# Patient Record
Sex: FEMALE | Race: WHITE | NOT HISPANIC OR LATINO | URBAN - METROPOLITAN AREA
[De-identification: names, ages, dates, MRNs, and addresses within clinical notes are randomized per-mention and may not be internally consistent; named-entity substitution may affect disease eponyms.]

---

## 2017-04-28 ENCOUNTER — OUTPATIENT (OUTPATIENT)
Dept: OUTPATIENT SERVICES | Facility: HOSPITAL | Age: 72
LOS: 1 days | Discharge: HOME | End: 2017-04-28

## 2017-06-28 DIAGNOSIS — J43.9 EMPHYSEMA, UNSPECIFIED: ICD-10-CM

## 2017-09-18 ENCOUNTER — OUTPATIENT (OUTPATIENT)
Dept: OUTPATIENT SERVICES | Facility: HOSPITAL | Age: 72
LOS: 1 days | Discharge: HOME | End: 2017-09-18

## 2017-09-18 DIAGNOSIS — Z12.31 ENCOUNTER FOR SCREENING MAMMOGRAM FOR MALIGNANT NEOPLASM OF BREAST: ICD-10-CM

## 2017-12-12 ENCOUNTER — OUTPATIENT (OUTPATIENT)
Dept: OUTPATIENT SERVICES | Facility: HOSPITAL | Age: 72
LOS: 1 days | Discharge: HOME | End: 2017-12-12

## 2017-12-14 DIAGNOSIS — N95.9 UNSPECIFIED MENOPAUSAL AND PERIMENOPAUSAL DISORDER: ICD-10-CM

## 2017-12-14 DIAGNOSIS — F17.200 NICOTINE DEPENDENCE, UNSPECIFIED, UNCOMPLICATED: ICD-10-CM

## 2017-12-14 DIAGNOSIS — Z13.820 ENCOUNTER FOR SCREENING FOR OSTEOPOROSIS: ICD-10-CM

## 2018-09-19 ENCOUNTER — OUTPATIENT (OUTPATIENT)
Dept: OUTPATIENT SERVICES | Facility: HOSPITAL | Age: 73
LOS: 1 days | Discharge: HOME | End: 2018-09-19

## 2018-09-19 DIAGNOSIS — Z12.31 ENCOUNTER FOR SCREENING MAMMOGRAM FOR MALIGNANT NEOPLASM OF BREAST: ICD-10-CM

## 2019-02-22 ENCOUNTER — OUTPATIENT (OUTPATIENT)
Dept: OUTPATIENT SERVICES | Facility: HOSPITAL | Age: 74
LOS: 1 days | Discharge: HOME | End: 2019-02-22

## 2019-02-22 DIAGNOSIS — R91.8 OTHER NONSPECIFIC ABNORMAL FINDING OF LUNG FIELD: ICD-10-CM

## 2019-02-27 ENCOUNTER — RESULT REVIEW (OUTPATIENT)
Age: 74
End: 2019-02-27

## 2019-02-27 ENCOUNTER — EMERGENCY (EMERGENCY)
Facility: HOSPITAL | Age: 74
LOS: 0 days | Discharge: HOME | End: 2019-02-27
Attending: EMERGENCY MEDICINE | Admitting: EMERGENCY MEDICINE

## 2019-02-27 VITALS
SYSTOLIC BLOOD PRESSURE: 129 MMHG | HEART RATE: 97 BPM | DIASTOLIC BLOOD PRESSURE: 86 MMHG | TEMPERATURE: 98 F | OXYGEN SATURATION: 97 % | RESPIRATION RATE: 20 BRPM

## 2019-02-27 VITALS — WEIGHT: 104.94 LBS | HEIGHT: 60 IN

## 2019-02-27 DIAGNOSIS — Z88.5 ALLERGY STATUS TO NARCOTIC AGENT: ICD-10-CM

## 2019-02-27 DIAGNOSIS — Z79.52 LONG TERM (CURRENT) USE OF SYSTEMIC STEROIDS: ICD-10-CM

## 2019-02-27 DIAGNOSIS — Z79.2 LONG TERM (CURRENT) USE OF ANTIBIOTICS: ICD-10-CM

## 2019-02-27 DIAGNOSIS — R06.02 SHORTNESS OF BREATH: ICD-10-CM

## 2019-02-27 DIAGNOSIS — Z79.899 OTHER LONG TERM (CURRENT) DRUG THERAPY: ICD-10-CM

## 2019-02-27 DIAGNOSIS — F17.200 NICOTINE DEPENDENCE, UNSPECIFIED, UNCOMPLICATED: ICD-10-CM

## 2019-02-27 DIAGNOSIS — J90 PLEURAL EFFUSION, NOT ELSEWHERE CLASSIFIED: ICD-10-CM

## 2019-02-27 LAB
ANION GAP SERPL CALC-SCNC: 15 MMOL/L — HIGH (ref 7–14)
APTT BLD: 29.9 SEC — SIGNIFICANT CHANGE UP (ref 27–39.2)
B PERT IGG+IGM PNL SER: ABNORMAL
BASOPHILS # BLD AUTO: 0.01 K/UL — SIGNIFICANT CHANGE UP (ref 0–0.2)
BASOPHILS NFR BLD AUTO: 0.1 % — SIGNIFICANT CHANGE UP (ref 0–1)
BUN SERPL-MCNC: 19 MG/DL — SIGNIFICANT CHANGE UP (ref 10–20)
CALCIUM SERPL-MCNC: 9.1 MG/DL — SIGNIFICANT CHANGE UP (ref 8.5–10.1)
CHLORIDE SERPL-SCNC: 97 MMOL/L — LOW (ref 98–110)
CO2 SERPL-SCNC: 30 MMOL/L — SIGNIFICANT CHANGE UP (ref 17–32)
COLOR FLD: SIGNIFICANT CHANGE UP
CREAT SERPL-MCNC: 0.6 MG/DL — LOW (ref 0.7–1.5)
EOSINOPHIL # BLD AUTO: 0 K/UL — SIGNIFICANT CHANGE UP (ref 0–0.7)
EOSINOPHIL NFR BLD AUTO: 0 % — SIGNIFICANT CHANGE UP (ref 0–8)
FLUID INTAKE SUBSTANCE CLASS: SIGNIFICANT CHANGE UP
FLUID SEGMENTED GRANULOCYTES: 7 % — SIGNIFICANT CHANGE UP
GLUCOSE SERPL-MCNC: 124 MG/DL — HIGH (ref 70–99)
HCT VFR BLD CALC: 46.3 % — SIGNIFICANT CHANGE UP (ref 37–47)
HGB BLD-MCNC: 15.9 G/DL — SIGNIFICANT CHANGE UP (ref 12–16)
IMM GRANULOCYTES NFR BLD AUTO: 0.4 % — HIGH (ref 0.1–0.3)
INR BLD: 1.05 RATIO — SIGNIFICANT CHANGE UP (ref 0.65–1.3)
LYMPHOCYTES # BLD AUTO: 0.64 K/UL — LOW (ref 1.2–3.4)
LYMPHOCYTES # BLD AUTO: 6.5 % — LOW (ref 20.5–51.1)
LYMPHOCYTES # FLD: 88 — SIGNIFICANT CHANGE UP
MCHC RBC-ENTMCNC: 32.2 PG — HIGH (ref 27–31)
MCHC RBC-ENTMCNC: 34.3 G/DL — SIGNIFICANT CHANGE UP (ref 32–37)
MCV RBC AUTO: 93.7 FL — SIGNIFICANT CHANGE UP (ref 81–99)
MONOCYTES # BLD AUTO: 0.29 K/UL — SIGNIFICANT CHANGE UP (ref 0.1–0.6)
MONOCYTES NFR BLD AUTO: 2.9 % — SIGNIFICANT CHANGE UP (ref 1.7–9.3)
MONOS+MACROS # FLD: 5 % — SIGNIFICANT CHANGE UP
NEUTROPHILS # BLD AUTO: 8.89 K/UL — HIGH (ref 1.4–6.5)
NEUTROPHILS NFR BLD AUTO: 90.1 % — HIGH (ref 42.2–75.2)
NRBC # BLD: 0 /100 WBCS — SIGNIFICANT CHANGE UP (ref 0–0)
NT-PROBNP SERPL-SCNC: 251 PG/ML — SIGNIFICANT CHANGE UP (ref 0–300)
PLATELET # BLD AUTO: 310 K/UL — SIGNIFICANT CHANGE UP (ref 130–400)
POTASSIUM SERPL-MCNC: 4.3 MMOL/L — SIGNIFICANT CHANGE UP (ref 3.5–5)
POTASSIUM SERPL-SCNC: 4.3 MMOL/L — SIGNIFICANT CHANGE UP (ref 3.5–5)
PROTHROM AB SERPL-ACNC: 12.1 SEC — SIGNIFICANT CHANGE UP (ref 9.95–12.87)
RBC # BLD: 4.94 M/UL — SIGNIFICANT CHANGE UP (ref 4.2–5.4)
RBC # FLD: 12.2 % — SIGNIFICANT CHANGE UP (ref 11.5–14.5)
RCV VOL RI: HIGH /UL (ref 0–5)
SODIUM SERPL-SCNC: 142 MMOL/L — SIGNIFICANT CHANGE UP (ref 135–146)
TOTAL NUCLEATED CELL COUNT, BODY FLUID: 4043 /UL — HIGH (ref 0–5)
TROPONIN T SERPL-MCNC: <0.01 NG/ML — SIGNIFICANT CHANGE UP
TUBE TYPE: SIGNIFICANT CHANGE UP
WBC # BLD: 9.87 K/UL — SIGNIFICANT CHANGE UP (ref 4.8–10.8)
WBC # FLD AUTO: 9.87 K/UL — SIGNIFICANT CHANGE UP (ref 4.8–10.8)

## 2019-02-27 RX ORDER — SODIUM CHLORIDE 9 MG/ML
3 INJECTION INTRAMUSCULAR; INTRAVENOUS; SUBCUTANEOUS EVERY 8 HOURS
Qty: 0 | Refills: 0 | Status: DISCONTINUED | OUTPATIENT
Start: 2019-02-27 | End: 2019-02-27

## 2019-02-27 RX ADMIN — SODIUM CHLORIDE 3 MILLILITER(S): 9 INJECTION INTRAMUSCULAR; INTRAVENOUS; SUBCUTANEOUS at 14:49

## 2019-02-27 NOTE — ED ADULT NURSE NOTE - NSFALLRSKASSESSDT_ED_ALL_ED
Telephone Encounter by Faviola Jensen NCMA at 05/14/18 03:52 PM     Author:  Faviola Jensen NCMA Service:  (none) Author Type:  Certified Medical Assistant     Filed:  05/14/18 03:54 PM Encounter Date:  5/14/2018 Status:  Signed     :  Faviola Jensen NCMA (Certified Medical Assistant)            Will postpone message to enter in-basket mid-June (per Olive, son) - will also add to LAR wait-list.  Patient to be rescheduled for office UD with Dr. Ta.[CF1.1M]      Revision History        User Key Date/Time User Provider Type Action    > CF1.1 05/14/18 03:54 PM Faviola Jensen NCMA Certified Medical Assistant Sign    M - Manual             27-Feb-2019 12:51

## 2019-02-27 NOTE — ED PROVIDER NOTE - CLINICAL SUMMARY MEDICAL DECISION MAKING FREE TEXT BOX
75 yo female with recently diagnosed lung mass with pleural effusion presented with SOB, referred to ED by Dr. Abrams for thoracentesis. Pt had procedure and repeat CXR without PTX. Pt reported feeling better and was offered admission but refused. Pt stated she preferred to continue her workup outpatient. Return precautions advised and pt and s/o verbalized understanding.

## 2019-02-27 NOTE — ED ADULT NURSE NOTE - OBJECTIVE STATEMENT
Patient presents to ED stating that she was sent by Dr. Melchor for drainage of excess fluid around the left lung. Patient states that she was recently treated for pneumonia and fluid on the lungs. The pneumonia was treated with antibiotics but following treatment the severe shortness of breathe on light exertion persisted and became accompanied by foot and ankle swelling.

## 2019-02-27 NOTE — ED PROVIDER NOTE - ATTENDING CONTRIBUTION TO CARE
75 yo female with PMH COPD, current smoker, recently treated for possible PNA but found to have left hilar mass and large effusion suspicious for malignancy. Pt with increased SOB and referred to ED for thoracentesis by her pulmonologist Dr. Abrams. Pt denies any fevers or chills.  + chronically poor appetite per pt. NO N/V/d or abdominal pain. Denies any chest pain, + cough.    VS noted, agree with exam as above.  A/P: SOB; Pleural effusion -labs, CXR, pulmonary consult

## 2019-02-27 NOTE — ED PROVIDER NOTE - OBJECTIVE STATEMENT
73 y/o female with hx COPD, smoker presents to the ED c/o "I've been SOB and left chest/ side pain and leg swelling for 1 month. I was diagnosed with pneumonia 2/5 and took a course of Cefuroxime and Prednisone taper. I saw my cardiologist and had an EKG and echo. I had a duplex of my legs which was negative and a chest ct. My pulmonary Dr Jose Alberto wanted to schedule me for thoracentesis but my SOB is getting worse." +cough no fever/ chills/ body aches/ leg pain

## 2019-02-27 NOTE — ED PROVIDER NOTE - MUSCULOSKELETAL, MLM
Spine appears normal, range of motion is not limited, no muscle or joint tenderness +foot swelling B/L

## 2019-02-27 NOTE — PROCEDURE NOTE - NSPROCDETAILS_GEN_ALL_CORE
catheter inserted over needle/ultrasound assessment of effusion (localization)/connection to syringe/connection to evacuated glass bottle/location identified, draped/prepped, sterile technique used, needle inserted/introduced

## 2019-02-27 NOTE — ED PROVIDER NOTE - NSFOLLOWUPINSTRUCTIONS_ED_ALL_ED_FT
FOLLOW UP WITH YOUR DOCTOR THIS WEEK FOR REEVALUATION.      RETURN TO ED IMMEDIATELY WITH ANY WORSENING SYMPTOMS, CHEST PAIN, SHORTNESS OF BREATH, FEVERS, WEAKNESS, DIZZINESS, PERSISTENT OR SEVERE HEADACHE OR ANY OTHER CONCERNS.     Shortness of breath    Shortness of breath (dyspnea) means you have trouble breathing and could indicate a medical problem. Causes include lung disease, heart disease, low amount of red blood cells (anemia), poor physical fitness, being overweight, smoking, etc. Your health care provider today may not be able to find a cause for your shortness of breath after your exam. In this case, it is important to have a follow-up exam with your primary care physician as instructed. If medicines were prescribed, take them as directed for the full length of time directed. Refrain from tobacco products.    SEEK IMMEDIATE MEDICAL CARE IF YOU HAVE ANY OF THE FOLLOWING SYMPTOMS: worsening shortness of breath, chest pain, back pain, abdominal pain, fever, coughing up blood, lightheadedness/dizziness.

## 2019-02-27 NOTE — ED PROVIDER NOTE - PROGRESS NOTE DETAILS
Spoke to Dr Abrams states recent CT showed mass and pleural effusion. Will send fellow to evaluate and plan to do thoracentesis. No need for CT chest with IV contrast. Pulmonary fellow at bedside doing thoracentesis. Patient does not want to be admitted- will continue outpatient work-up. Pt s/o to Dr. Flyod to follow up CXR, reassess and dispo. Pt s/p thoracentesis. Per pulm fellow who discussed with attending pt can be d/c if repeat CXR negative (no PTX). Pt adamantly refusing admission, prefers to continue workup outpatient and will follow up closely with her providers. Pt s/p thoracentesis. Per pulm fellow who discussed with attending pt can be d/c if repeat CXR negative (no PTX). Pt adamantly refusing admission, prefers to continue workup outpatient and will follow up closely with her providers. CXR without any PTX noted, pt reports sx improved.

## 2019-02-28 LAB
ALBUMIN FLD-MCNC: 2.1 G/DL — SIGNIFICANT CHANGE UP
GLUCOSE FLD-MCNC: 118 MG/DL — SIGNIFICANT CHANGE UP
GRAM STN FLD: SIGNIFICANT CHANGE UP
LDH SERPL L TO P-CCNC: 344 U/L — SIGNIFICANT CHANGE UP
PROT FLD-MCNC: 3.5 G/DL — SIGNIFICANT CHANGE UP
SPECIMEN SOURCE: SIGNIFICANT CHANGE UP

## 2019-03-05 LAB
CULTURE RESULTS: SIGNIFICANT CHANGE UP
NON-GYNECOLOGICAL CYTOLOGY STUDY: SIGNIFICANT CHANGE UP
SPECIMEN SOURCE: SIGNIFICANT CHANGE UP

## 2019-03-06 ENCOUNTER — INPATIENT (INPATIENT)
Facility: HOSPITAL | Age: 74
LOS: 5 days | Discharge: HOME HEALTH | End: 2019-03-12
Attending: HOSPITALIST | Admitting: HOSPITALIST
Payer: MEDICARE

## 2019-03-06 VITALS
TEMPERATURE: 98 F | OXYGEN SATURATION: 93 % | SYSTOLIC BLOOD PRESSURE: 99 MMHG | HEART RATE: 100 BPM | RESPIRATION RATE: 22 BRPM | DIASTOLIC BLOOD PRESSURE: 74 MMHG

## 2019-03-06 PROBLEM — F41.9 ANXIETY DISORDER, UNSPECIFIED: Chronic | Status: ACTIVE | Noted: 2019-02-27

## 2019-03-06 PROBLEM — J44.9 CHRONIC OBSTRUCTIVE PULMONARY DISEASE, UNSPECIFIED: Chronic | Status: ACTIVE | Noted: 2019-02-27

## 2019-03-06 LAB
ALBUMIN SERPL ELPH-MCNC: 3.4 G/DL — LOW (ref 3.5–5.2)
ALP SERPL-CCNC: 64 U/L — SIGNIFICANT CHANGE UP (ref 30–115)
ALT FLD-CCNC: 30 U/L — SIGNIFICANT CHANGE UP (ref 0–41)
ANION GAP SERPL CALC-SCNC: 13 MMOL/L — SIGNIFICANT CHANGE UP (ref 7–14)
APTT BLD: 28.5 SEC — SIGNIFICANT CHANGE UP (ref 27–39.2)
AST SERPL-CCNC: 32 U/L — SIGNIFICANT CHANGE UP (ref 0–41)
BASOPHILS # BLD AUTO: 0.03 K/UL — SIGNIFICANT CHANGE UP (ref 0–0.2)
BASOPHILS NFR BLD AUTO: 0.3 % — SIGNIFICANT CHANGE UP (ref 0–1)
BILIRUB SERPL-MCNC: 0.3 MG/DL — SIGNIFICANT CHANGE UP (ref 0.2–1.2)
BUN SERPL-MCNC: 20 MG/DL — SIGNIFICANT CHANGE UP (ref 10–20)
CALCIUM SERPL-MCNC: 8.8 MG/DL — SIGNIFICANT CHANGE UP (ref 8.5–10.1)
CHLORIDE SERPL-SCNC: 97 MMOL/L — LOW (ref 98–110)
CO2 SERPL-SCNC: 28 MMOL/L — SIGNIFICANT CHANGE UP (ref 17–32)
CREAT SERPL-MCNC: 0.6 MG/DL — LOW (ref 0.7–1.5)
EOSINOPHIL # BLD AUTO: 0.01 K/UL — SIGNIFICANT CHANGE UP (ref 0–0.7)
EOSINOPHIL NFR BLD AUTO: 0.1 % — SIGNIFICANT CHANGE UP (ref 0–8)
GLUCOSE SERPL-MCNC: 110 MG/DL — HIGH (ref 70–99)
HCT VFR BLD CALC: 43 % — SIGNIFICANT CHANGE UP (ref 37–47)
HGB BLD-MCNC: 14.8 G/DL — SIGNIFICANT CHANGE UP (ref 12–16)
IMM GRANULOCYTES NFR BLD AUTO: 0.4 % — HIGH (ref 0.1–0.3)
INR BLD: 1.11 RATIO — SIGNIFICANT CHANGE UP (ref 0.65–1.3)
LYMPHOCYTES # BLD AUTO: 0.58 K/UL — LOW (ref 1.2–3.4)
LYMPHOCYTES # BLD AUTO: 5.7 % — LOW (ref 20.5–51.1)
MCHC RBC-ENTMCNC: 32.4 PG — HIGH (ref 27–31)
MCHC RBC-ENTMCNC: 34.4 G/DL — SIGNIFICANT CHANGE UP (ref 32–37)
MCV RBC AUTO: 94.1 FL — SIGNIFICANT CHANGE UP (ref 81–99)
MONOCYTES # BLD AUTO: 0.44 K/UL — SIGNIFICANT CHANGE UP (ref 0.1–0.6)
MONOCYTES NFR BLD AUTO: 4.3 % — SIGNIFICANT CHANGE UP (ref 1.7–9.3)
NEUTROPHILS # BLD AUTO: 9.14 K/UL — HIGH (ref 1.4–6.5)
NEUTROPHILS NFR BLD AUTO: 89.2 % — HIGH (ref 42.2–75.2)
NRBC # BLD: 0 /100 WBCS — SIGNIFICANT CHANGE UP (ref 0–0)
PLATELET # BLD AUTO: 304 K/UL — SIGNIFICANT CHANGE UP (ref 130–400)
POTASSIUM SERPL-MCNC: 3.8 MMOL/L — SIGNIFICANT CHANGE UP (ref 3.5–5)
POTASSIUM SERPL-SCNC: 3.8 MMOL/L — SIGNIFICANT CHANGE UP (ref 3.5–5)
PROT SERPL-MCNC: 5.9 G/DL — LOW (ref 6–8)
PROTHROM AB SERPL-ACNC: 12.8 SEC — SIGNIFICANT CHANGE UP (ref 9.95–12.87)
RBC # BLD: 4.57 M/UL — SIGNIFICANT CHANGE UP (ref 4.2–5.4)
RBC # FLD: 12.4 % — SIGNIFICANT CHANGE UP (ref 11.5–14.5)
SODIUM SERPL-SCNC: 138 MMOL/L — SIGNIFICANT CHANGE UP (ref 135–146)
WBC # BLD: 10.24 K/UL — SIGNIFICANT CHANGE UP (ref 4.8–10.8)
WBC # FLD AUTO: 10.24 K/UL — SIGNIFICANT CHANGE UP (ref 4.8–10.8)

## 2019-03-06 RX ORDER — FUROSEMIDE 40 MG
0 TABLET ORAL
Qty: 0 | Refills: 0 | COMMUNITY

## 2019-03-06 RX ORDER — ALPRAZOLAM 0.25 MG
0.25 TABLET ORAL ONCE
Qty: 0 | Refills: 0 | Status: DISCONTINUED | OUTPATIENT
Start: 2019-03-06 | End: 2019-03-06

## 2019-03-06 RX ORDER — ALBUTEROL 90 UG/1
0 AEROSOL, METERED ORAL
Qty: 0 | Refills: 0 | COMMUNITY

## 2019-03-06 RX ORDER — SERTRALINE 25 MG/1
0 TABLET, FILM COATED ORAL
Qty: 0 | Refills: 0 | COMMUNITY

## 2019-03-06 RX ORDER — UMECLIDINIUM BROMIDE AND VILANTEROL TRIFENATATE 62.5; 25 UG/1; UG/1
1 POWDER RESPIRATORY (INHALATION)
Qty: 0 | Refills: 0 | COMMUNITY

## 2019-03-06 RX ADMIN — Medication 0.25 MILLIGRAM(S): at 16:14

## 2019-03-06 NOTE — ED PROVIDER NOTE - PHYSICAL EXAMINATION
Vital Signs: I have reviewed the initial vital signs.  Constitutional: thin female, appears stated age, no acute distress  HEENT: PERRL, pink conjunctivae., nml phonation  NEck: supple, no palpable masses or HOLLIE  Cardiovascular: regular rate, regular rhythm, well-perfused extremities, capillary refill < 2 sec, distal pulses intact  Respiratory: unlabored respiratory effort, no wheezing or rhonchi, decreased air entry at the left base, s  Integumentary: warm, dry, no rash  Neurologic: awake, al Vital Signs: I have reviewed the initial vital signs.  Constitutional: thin female, appears stated age, no acute distress  HEENT: PERRL, pink conjunctivae, dry mouth, nml phonation,   Neck: supple, no palpable masses  Cardiovascular: regular rate, regular rhythm, well-perfused extremities, capillary refill < 2 sec, distal pulses intact  Respiratory: unlabored respiratory effort, speaking full sentences, no wheezing or rhonchi, decreased breath sounds at the left  base, mild dyspnea on ambulation  Gastrointestinal: soft, non-distended abdomen, minimal LUQ ttp without rebound or guarding, no CVA ttp, no pulsatile mass  Musculoskeletal: supple neck, no midline spine ttp, no calf ttp, b/l mild lower extremity edema below the knees, FROM at all joints  Integumentary: warm, dry, no rash  Neurologic: awake, alert, cranial nerves II-XII grossly intact, extremities’ motor and sensory functions grossly intact  Psychiatric: appropriate mood, appropriate affect

## 2019-03-06 NOTE — ED ADULT NURSE NOTE - NSIMPLEMENTINTERV_GEN_ALL_ED
Implemented All Fall Risk Interventions:  [place] to call system. Call bell, personal items and telephone within reach. Instruct patient to call for assistance. Room bathroom lighting operational. Non-slip footwear when patient is off stretcher. Physically safe environment: no spills, clutter or unnecessary equipment. Stretcher in lowest position, wheels locked, appropriate side rails in place. Provide visual cue, wrist band, yellow gown, etc. Monitor gait and stability. Monitor for mental status changes and reorient to person, place, and time. Review medications for side effects contributing to fall risk. Reinforce activity limits and safety measures with patient and family.

## 2019-03-06 NOTE — ED PROVIDER NOTE - NS ED ROS FT
Constitutional: (-) fever, (-) chills, (+) weight loss  Eyes/ENT: (-) blurry vision, (-) epistaxis  Cardiovascular: (-) chest pain, (-) syncope, (-)palpitations  Respiratory: (+) cough, (-) hemoptysis, (+) shortness of breath worse on exertion  Gastrointestinal: (-)nausea or vomiting, (-) diarrhea, (-) black or bloody stools, (+) left sided upper abdominal pain  : (-) dysuria/hematuria or frequency  Musculoskeletal: (-) neck pain, (-) back pain, (-) joint pain  Integumentary: (-) rash, (+) ankle edema  Neurological: (-) headache, (-) altered mental status, (-) paresthesias or focal weakness  Allergic/Immunologic: (-) pruritus

## 2019-03-06 NOTE — ED ADULT NURSE REASSESSMENT NOTE - NS ED NURSE REASSESS COMMENT FT1
Pt remains stable, AOX3, no s/s of any distress noted. IV line in place, no signs of infiltration noted. VS WNL. Call bell in reach, bed in lowest position. Safety maintained, hourly rounding performed. Awaiting for CT scan.

## 2019-03-06 NOTE — ED ADULT NURSE NOTE - OBJECTIVE STATEMENT
Pt AOx4, ambulatory, h/o COPD, PNA, c/o SOB since yesterday. Pt states she was dc last week for "I had fluid extracted from my lungs". P/w BL LE edema. Pt denies CP.

## 2019-03-06 NOTE — H&P ADULT - ATTENDING COMMENTS
This is 73 yo female with PMH of COPD, anxiety, recently found with left lung mass and pleural effusion came to ED c/o worsening SOB and cough.  Patient was seen in the ED on 2/27 for similar complaints, chest CT showed left hilar mass and large pleural effusion, patient underwent thoracentesis nad  and chronic smoking  yo F with COPD (not on home O2) and anxiety presenting with worsening SOB and cough x10 days.  Pt was evaluated in ED on Feb 27 for same complaint. She had a L thoracentesis and was discharged home.  Pt had a CT chest as OP showing L lung mass with adjacent effusion suspicious for malignancy. results were never discussed with pt  Pt denies any fever or chills. + cough, worse at night time,+ orthopnea, + left sided flank/ LUQ pain.  Pt has bilateral LE edema. She lost 10 lbs in past 2 months. She has poor appetite   No other complaints This is 73 yo female with PMH of COPD, anxiety, recently found with left lung mass and pleural effusion came to ED c/o worsening SOB and cough.  Patient was seen in the ED on 2/27 for similar complaints, chest CT showed left hilar mass and large pleural effusion, patient underwent thoracentesis and was discharged home. She was again feeling worsening SOB with cough and white sputum, she denies any ever or chills, she also c/o left flank pain and worsening LE edema so she came to ED.    in the ED /73, , RR 22. temp 98. O2sat 97% on RA. CXR showed increasing left pleural effusion.     PHYSICAL EXAM:  GENERAL: NAD, well-developed  HEAD:  Atraumatic, Normocephalic  EYES: EOMI, PERRLA, conjunctiva and sclera clear  NECK: Supple, No JVD  CHEST/LUNG: Decrease breath sound on the lower two thrid of left lung.   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   Pleural fluid path is positive for adenocarcinoma, pending biomarkers and PD-L1.   Pulmonary consult for another thoracentesis, patient may need Pleurix cath.   Oncology consult.     LE edema:   possible from   Check venous duplex to rule out DVT.     COPD:   Lungs are clear.  Continue DuoNeb prn.     Anxiety:   Continue Zoloft daily and Xanax prn.

## 2019-03-06 NOTE — ED PROVIDER NOTE - CLINICAL SUMMARY MEDICAL DECISION MAKING FREE TEXT BOX
75 yo female with SOB due to recurrent pleural effusion and underlying lung mass.  CT A chest negative for PE, large effusion present, will admit for further work up.

## 2019-03-06 NOTE — H&P ADULT - HISTORY OF PRESENT ILLNESS
73 yo F with COPD (not on home O2) and anxiety presenting with worsening SOB and cough x10 days.  Pt was evaluated in ED on Feb 27 for same complaint. She had a L thoracentesis and was discharged home.  Pt had a CT chest as OP showing L lung mass with adjacent effusion suspicious for malignancy. results were never discussed with pt  Pt denies any fever or chills. + cough, worse at night time,+ orthopnea, + left sided flank/ LUQ pain.  Pt has bilateral LE edema. She lost 10 lbs in past 2 months. She has poor appetite   No other complaints

## 2019-03-06 NOTE — ED PROVIDER NOTE - OBJECTIVE STATEMENT
75 yo female h/o COPD not on home oxygen, current smoker, anxiety, recent CT scan of chest showing left lung mass and pleural effusion, s/p thoracentesis on 2/27/19 c/o continuous SOB worse in AM and evenings, cough exacerbated by supine position b/l ankle swelling.  Patient just finished taper dose of steroids. Denies  fever, chills, N/V/diarrhea, no urinary complaints.  Also reports weight loss over past few weeks and vague left sided abdominal pain since last night.

## 2019-03-06 NOTE — ED PROVIDER NOTE - PROGRESS NOTE DETAILS
The case was d/c Dr Abrams , will admit the patient for further work up. CTA negative for PE, patient was very anxious before, was given Xanax 0.25 mg which worked well for her.  Will admit for further work up.

## 2019-03-06 NOTE — H&P ADULT - NSHPLABSRESULTS_GEN_ALL_CORE
14.8   10.24 )-----------( 304      ( 06 Mar 2019 12:34 )             43.0     03-06    138  |  97<L>  |  20  ----------------------------<  110<H>  3.8   |  28  |  0.6<L>    Ca    8.8      06 Mar 2019 12:34    TPro  5.9<L>  /  Alb  3.4<L>  /  TBili  0.3  /  DBili  x   /  AST  32  /  ALT  30  /  AlkPhos  64  03-06      PT/INR - ( 06 Mar 2019 13:56 )   PT: 12.80 sec;   INR: 1.11 ratio    PTT - ( 06 Mar 2019 13:56 )  PTT:28.5 sec    CT Angio Chest w/ IV Cont (03.06.19 @ 19:58) >    -No evidence for acute pulmonary embolism. Left hilar mass encases and   narrows the left main pulmonary artery and subsequent branches.  -Interval increase in size of a left pleural effusion and compressive   atelectasis, now moderate to large.  -Re demonstration of a 5 cm left upper lobe consolidation compatible with   patient's mass or postobstructive consolidation. Clinical correlation   would be needed to exclude infection.      Xray Chest 2 Views PA/Lat (03.06.19 @ 13:06) >  Moderate left pleural effusion, significantly increased since the prior   exam.  Stable superimposed diffuse left lung opacities.    12 Lead ECG (03.06.19 @ 12:23) >   Sinus rhythm with short IA  Possible Left atrial enlargement  Borderline ECG    Cell Count, Body Fluid (02.27.19 @ 16:01)    Monocyte/Macrophage Count - Body Fluid: 5 %    Fluid Segmented Granulocytes: 7 %    Fluid Type: Pleural    Body Fluid Appearance: Cloudy    BF Lymphocytes: 88    Tube Type: Lavender    Color - Body Fluid: Red    Total Nucleated Cell Count, Body Fluid: 4043 /uL    Total RBC Count: 79436 /uL    Lactate Dehydrogenase, Fluid: 344

## 2019-03-07 ENCOUNTER — RESULT REVIEW (OUTPATIENT)
Age: 74
End: 2019-03-07

## 2019-03-07 DIAGNOSIS — Z90.49 ACQUIRED ABSENCE OF OTHER SPECIFIED PARTS OF DIGESTIVE TRACT: Chronic | ICD-10-CM

## 2019-03-07 DIAGNOSIS — Z98.890 OTHER SPECIFIED POSTPROCEDURAL STATES: Chronic | ICD-10-CM

## 2019-03-07 LAB
ANION GAP SERPL CALC-SCNC: 10 MMOL/L — SIGNIFICANT CHANGE UP (ref 7–14)
B PERT IGG+IGM PNL SER: ABNORMAL
BASOPHILS # BLD AUTO: 0.07 K/UL — SIGNIFICANT CHANGE UP (ref 0–0.2)
BASOPHILS NFR BLD AUTO: 0.9 % — SIGNIFICANT CHANGE UP (ref 0–1)
BUN SERPL-MCNC: 22 MG/DL — HIGH (ref 10–20)
CALCIUM SERPL-MCNC: 8.8 MG/DL — SIGNIFICANT CHANGE UP (ref 8.5–10.1)
CHLORIDE SERPL-SCNC: 101 MMOL/L — SIGNIFICANT CHANGE UP (ref 98–110)
CO2 SERPL-SCNC: 32 MMOL/L — SIGNIFICANT CHANGE UP (ref 17–32)
COLOR FLD: SIGNIFICANT CHANGE UP
CREAT SERPL-MCNC: 0.7 MG/DL — SIGNIFICANT CHANGE UP (ref 0.7–1.5)
EOSINOPHIL # BLD AUTO: 0.15 K/UL — SIGNIFICANT CHANGE UP (ref 0–0.7)
EOSINOPHIL NFR BLD AUTO: 1.9 % — SIGNIFICANT CHANGE UP (ref 0–8)
FLUID INTAKE SUBSTANCE CLASS: SIGNIFICANT CHANGE UP
FLUID SEGMENTED GRANULOCYTES: 2 % — SIGNIFICANT CHANGE UP
GLUCOSE SERPL-MCNC: 101 MG/DL — HIGH (ref 70–99)
GRAM STN FLD: SIGNIFICANT CHANGE UP
HCT VFR BLD CALC: 43.1 % — SIGNIFICANT CHANGE UP (ref 37–47)
HGB BLD-MCNC: 14.5 G/DL — SIGNIFICANT CHANGE UP (ref 12–16)
IMM GRANULOCYTES NFR BLD AUTO: 0.6 % — HIGH (ref 0.1–0.3)
LYMPHOCYTES # BLD AUTO: 1 K/UL — LOW (ref 1.2–3.4)
LYMPHOCYTES # BLD AUTO: 12.5 % — LOW (ref 20.5–51.1)
LYMPHOCYTES # FLD: 90 — SIGNIFICANT CHANGE UP
MCHC RBC-ENTMCNC: 31.7 PG — HIGH (ref 27–31)
MCHC RBC-ENTMCNC: 33.6 G/DL — SIGNIFICANT CHANGE UP (ref 32–37)
MCV RBC AUTO: 94.1 FL — SIGNIFICANT CHANGE UP (ref 81–99)
MONOCYTES # BLD AUTO: 0.76 K/UL — HIGH (ref 0.1–0.6)
MONOCYTES NFR BLD AUTO: 9.5 % — HIGH (ref 1.7–9.3)
MONOS+MACROS # FLD: 8 % — SIGNIFICANT CHANGE UP
NEUTROPHILS # BLD AUTO: 6 K/UL — SIGNIFICANT CHANGE UP (ref 1.4–6.5)
NEUTROPHILS NFR BLD AUTO: 74.6 % — SIGNIFICANT CHANGE UP (ref 42.2–75.2)
NRBC # BLD: 0 /100 WBCS — SIGNIFICANT CHANGE UP (ref 0–0)
PLATELET # BLD AUTO: 333 K/UL — SIGNIFICANT CHANGE UP (ref 130–400)
POTASSIUM SERPL-MCNC: 4.6 MMOL/L — SIGNIFICANT CHANGE UP (ref 3.5–5)
POTASSIUM SERPL-SCNC: 4.6 MMOL/L — SIGNIFICANT CHANGE UP (ref 3.5–5)
RBC # BLD: 4.58 M/UL — SIGNIFICANT CHANGE UP (ref 4.2–5.4)
RBC # FLD: 12.4 % — SIGNIFICANT CHANGE UP (ref 11.5–14.5)
RCV VOL RI: HIGH /UL (ref 0–5)
SODIUM SERPL-SCNC: 143 MMOL/L — SIGNIFICANT CHANGE UP (ref 135–146)
SPECIMEN SOURCE: SIGNIFICANT CHANGE UP
TOTAL NUCLEATED CELL COUNT, BODY FLUID: 3181 /UL — HIGH (ref 0–5)
TUBE TYPE: SIGNIFICANT CHANGE UP
WBC # BLD: 8.03 K/UL — SIGNIFICANT CHANGE UP (ref 4.8–10.8)
WBC # FLD AUTO: 8.03 K/UL — SIGNIFICANT CHANGE UP (ref 4.8–10.8)

## 2019-03-07 RX ORDER — ALPRAZOLAM 0.25 MG
0.25 TABLET ORAL THREE TIMES A DAY
Qty: 0 | Refills: 0 | Status: DISCONTINUED | OUTPATIENT
Start: 2019-03-07 | End: 2019-03-12

## 2019-03-07 RX ORDER — FUROSEMIDE 40 MG
40 TABLET ORAL DAILY
Qty: 0 | Refills: 0 | Status: DISCONTINUED | OUTPATIENT
Start: 2019-03-07 | End: 2019-03-12

## 2019-03-07 RX ORDER — ACETAMINOPHEN 500 MG
650 TABLET ORAL EVERY 6 HOURS
Qty: 0 | Refills: 0 | Status: DISCONTINUED | OUTPATIENT
Start: 2019-03-07 | End: 2019-03-12

## 2019-03-07 RX ORDER — SERTRALINE 25 MG/1
25 TABLET, FILM COATED ORAL DAILY
Qty: 0 | Refills: 0 | Status: DISCONTINUED | OUTPATIENT
Start: 2019-03-07 | End: 2019-03-12

## 2019-03-07 RX ORDER — ENOXAPARIN SODIUM 100 MG/ML
40 INJECTION SUBCUTANEOUS DAILY
Qty: 0 | Refills: 0 | Status: DISCONTINUED | OUTPATIENT
Start: 2019-03-07 | End: 2019-03-12

## 2019-03-07 RX ORDER — HEPARIN SODIUM 5000 [USP'U]/ML
5000 INJECTION INTRAVENOUS; SUBCUTANEOUS EVERY 12 HOURS
Qty: 0 | Refills: 0 | Status: DISCONTINUED | OUTPATIENT
Start: 2019-03-07 | End: 2019-03-07

## 2019-03-07 RX ORDER — IPRATROPIUM/ALBUTEROL SULFATE 18-103MCG
3 AEROSOL WITH ADAPTER (GRAM) INHALATION EVERY 6 HOURS
Qty: 0 | Refills: 0 | Status: DISCONTINUED | OUTPATIENT
Start: 2019-03-07 | End: 2019-03-12

## 2019-03-07 RX ORDER — CHLORHEXIDINE GLUCONATE 213 G/1000ML
1 SOLUTION TOPICAL
Qty: 0 | Refills: 0 | Status: DISCONTINUED | OUTPATIENT
Start: 2019-03-07 | End: 2019-03-12

## 2019-03-07 RX ADMIN — Medication 40 MILLIGRAM(S): at 06:08

## 2019-03-07 RX ADMIN — Medication 3 MILLILITER(S): at 14:16

## 2019-03-07 RX ADMIN — SERTRALINE 25 MILLIGRAM(S): 25 TABLET, FILM COATED ORAL at 14:15

## 2019-03-07 RX ADMIN — Medication 3 MILLILITER(S): at 02:11

## 2019-03-07 RX ADMIN — Medication 3 MILLILITER(S): at 07:51

## 2019-03-07 RX ADMIN — HEPARIN SODIUM 5000 UNIT(S): 5000 INJECTION INTRAVENOUS; SUBCUTANEOUS at 06:08

## 2019-03-07 NOTE — CONSULT NOTE ADULT - ASSESSMENT
IMPRESSION:  Left sided pleural effusion - due to adenocarcinoma s/p thoracentesis  COPD not in exacerbation  ?Postobstructive PNA    RECOMMENDATIONS:  Follow up PFA   Hem/Onc evaluation  Will need [place] catheter   Overall prognosis is poor  Case discusses extensively with  IMPRESSION:    Left sided pleural effusion - due to adenocarcinoma s/p thoracentesis  COPD not in exacerbation    RECOMMENDATIONS:      repeat therapeutic thora/ will need pleurx catheter  Hem/Onc evaluation  pox ra and on ambulation/ op f/up   Overall prognosis is poor  Case discusses extensively with

## 2019-03-07 NOTE — PROGRESS NOTE ADULT - ASSESSMENT
73 yo F with COPD (not on home O2) and anxiety presenting with worsening SOB and cough x10 days.    # L Lung Mass with left pleural effusion: likely malignant  - supplemental O2 through [facility]  - pulmoary following   - s/p thoracocentesis (03/07/19)  - f/u fluid results  - will need pigtail insertion given recurrence -->  informed     # COPD  - stable  - Duoneb prn    # Anxiety/ depression  - c/w sertraline  - xanax prn    # DVT ppx  - Lovenox    # Regular diet    # Ambulate as tolerated    # Full code status 73 yo F with COPD (not on home O2) and anxiety presenting with worsening SOB and cough x10 days.    # L Lung Mass with left pleural effusion: likely malignant  - supplemental O2 through [facility]  - pulmonary following: s/p thoracocentesis (1400ml removed (03/07/19))  - f/u fluid results  - will need pigtail insertion given recurrence -->  informed     # COPD  - stable  - Duoneb prn    # Anxiety/ depression  - c/w sertraline  - xanax prn    # DVT ppx  - Lovenox    # Regular diet    # Ambulate as tolerated    # Full code status

## 2019-03-07 NOTE — CONSULT NOTE ADULT - SUBJECTIVE AND OBJECTIVE BOX
Patient is a 74y old  Female who presents with a chief complaint of SOB (07 Mar 2019 12:24)      HPI:  75 yo F with COPD (not on home O2) and anxiety presenting with worsening SOB and cough x10 days.  Pt was evaluated in ED on Feb 27 for same complaint. She had a L thoracentesis and was discharged home.  Pt had a CT chest as OP showing L lung mass with adjacent effusion suspicious for malignancy. results were never discussed with pt  Pt denies any fever or chills. + cough, worse at night time,+ orthopnea, + left sided flank/ LUQ pain.  Pt has bilateral LE edema. She lost 10 lbs in past 2 months. She has poor appetite   No other complaints (06 Mar 2019 23:42)      PAST MEDICAL & SURGICAL HISTORY:  Lung mass  COPD (chronic obstructive pulmonary disease)  Anxiety  H/O hernia repair  S/P appendectomy      SOCIAL HX:   Smoking   ex smoker quit a few weeks ago                        Other    FAMILY HISTORY:  No pertinent family history in first degree relatives  .  No cardiovascular or pulmonary family history     Review of System:  See HPI    Allergies    codeine (Rash)    Intolerances          PHYSICAL EXAM  Vital Signs Last 24 Hrs  T(C): 36.4 (07 Mar 2019 01:23), Max: 36.8 (06 Mar 2019 17:14)  T(F): 97.6 (07 Mar 2019 01:23), Max: 98.3 (07 Mar 2019 00:00)  HR: 96 (07 Mar 2019 01:23) (78 - 96)  BP: 120/70 (07 Mar 2019 01:23) (104/59 - 120/70)  RR: 20 (07 Mar 2019 01:23) (19 - 20)  SpO2: 96% (07 Mar 2019 01:23) (92% - 98%)    General: In NAD  HEENT: NILDA             Lymphatic system: No cervical LN     Lungs: Decreased left side breath sounds.  Cardiovascular: Regular  Gastrointestinal: Soft.  + BS   Musculoskeletal: No Clubbing.  Full range of motion.. Moves all extremities  Skin: Warm.  Intact  Neurological: No motor or sensory deficit       LABS:                          14.5   8.03  )-----------( 333      ( 07 Mar 2019 05:54 )             43.1                                               03-07    143  |  101  |  22<H>  ----------------------------<  101<H>  4.6   |  32  |  0.7    Ca    8.8      07 Mar 2019 05:54    TPro  5.9<L>  /  Alb  3.4<L>  /  TBili  0.3  /  DBili  x   /  AST  32  /  ALT  30  /  AlkPhos  64  03-06      PT/INR - ( 06 Mar 2019 13:56 )   PT: 12.80 sec;   INR: 1.11 ratio         PTT - ( 06 Mar 2019 13:56 )  PTT:28.5 sec                                             LIVER FUNCTIONS - ( 06 Mar 2019 12:34 )  Alb: 3.4 g/dL / Pro: 5.9 g/dL / ALK PHOS: 64 U/L / ALT: 30 U/L / AST: 32 U/L / GGT: x                                              Cytopathology - Non Gyn Report:   ACCESSION No:  [id number]    EDILBERTO ARELLANO        Cytopathology Report            Specimen(s) Submitted  Left pleural fluid      Clinical History  Left pleural fluid with lung mass, smoker      Gross Description  The specimen is received fresh labeled with the patient's name  and consists of 70 ml of catrachito fluid. One monolayer slide (Thin  Prep) is prepared and stained with Papanicolaou stain. One cell  block is made.      Final Diagnosis  PLEURAL FLUID, LEFT  POSITIVE FOR MALIGNANT CELLS.  Metastatic adenocarcinoma.  (See note)    Note: Cytology slide and cell block show a hypercellular  specimen, displaying crowded 3-dimensional groups and single  lying malignant glandular cells, in a background of reactive  mesothelial cells, histiocytes and small lymphocytes. The  neoplastic cells are positive for TTF-1, calretinin (focal, weak)  and Moises-EP4 (focal, weak), while negative for CK7, CK20, CK5/6,  p63, Napsin-A and CD68 immunostains.  Reported lung mass and smoking history noted. The immunoprofile  is in favor of metastatic lung adenocarcinoma.  Testing for molecular biomarkers and PD-L1 in progress; results  will be reported in an addendum.    Screened by: Inés SAMPSON(ASCP)  Verified by: Gracie Chau M.D.  (Electronic Signature)  Reported on: 03/05/19 12:17 EST, [facility], [address]rd Floor,  [place] [address]  _________________________________________________________________      Comment  This case was reviewed in intradepartmental QAconference and the  diagnosis represents a consensus opinion. Dr. MARLYS Bourgeois was  informed of the findings on 3/5/19 by encrypted e-mail.  Case reported to Tumor Registry. (02.27.19 @ 09:23)                                                      MEDICATIONS  (STANDING):  ALBUTerol/ipratropium for Nebulization 3 milliLiter(s) Nebulizer every 6 hours  enoxaparin Injectable 40 milliGRAM(s) SubCutaneous daily  furosemide    Tablet 40 milliGRAM(s) Oral daily  sertraline 25 milliGRAM(s) Oral daily    MEDICATIONS  (PRN):  acetaminophen   Tablet .. 650 milliGRAM(s) Oral every 6 hours PRN Moderate Pain (4 - 6)  ALPRAZolam 0.25 milliGRAM(s) Oral three times a day PRN anxiety Patient is a 74y old  Female who presents with a chief complaint of SOB (07 Mar 2019 12:24)      HPI:  73 yo F with COPD (not on home O2) and anxiety presenting with worsening SOB and cough x10 days. Pt was evaluated in ED on Feb 27 for same complaint. She had a L thoracentesis and was discharged home. Pt had a CT chest as OP showing l hilar mass/ pleural effusion s/p thora adenoca. Pt denies any fever or chills. + cough, worse at night time,+ orthopnea, + left sided flank/ LUQ pain.  Pt has bilateral LE edema. She lost 10 lbs in past 2 months. She has poor appetite   No other complaints (06 Mar 2019 23:42), in ed had chest ct/ reaccumulation called to evalutate      PAST MEDICAL & SURGICAL HISTORY:  Lung mass  COPD (chronic obstructive pulmonary disease)  Anxiety  H/O hernia repair  S/P appendectomy      SOCIAL HX:   Smoking   ex smoker quit a few weeks ago                            FAMILY HISTORY:  No pertinent family history in first degree relatives  .  No cardiovascular or pulmonary family history     Review of System:  See HPI    Allergies    codeine (Rash)    Intolerances          PHYSICAL EXAM  Vital Signs Last 24 Hrs  T(C): 36.4 (07 Mar 2019 01:23), Max: 36.8 (06 Mar 2019 17:14)  T(F): 97.6 (07 Mar 2019 01:23), Max: 98.3 (07 Mar 2019 00:00)  HR: 96 (07 Mar 2019 01:23) (78 - 96)  BP: 120/70 (07 Mar 2019 01:23) (104/59 - 120/70)  RR: 20 (07 Mar 2019 01:23) (19 - 20)  SpO2: 96% (07 Mar 2019 01:23) (92% - 98%)    General: In NAD  HEENT: NILDA, cachectic            Lymphatic system: No cervical LN     Lungs: Decreased left side breath sounds.  Cardiovascular: Regular  Gastrointestinal: Soft.  + BS   Musculoskeletal: No Clubbing.  Full range of motion.. Moves all extremities  Skin: Warm.  Intact  Neurological: No motor or sensory deficit       LABS:                          14.5   8.03  )-----------( 333      ( 07 Mar 2019 05:54 )             43.1                                               03-07    143  |  101  |  22<H>  ----------------------------<  101<H>  4.6   |  32  |  0.7    Ca    8.8      07 Mar 2019 05:54    TPro  5.9<L>  /  Alb  3.4<L>  /  TBili  0.3  /  DBili  x   /  AST  32  /  ALT  30  /  AlkPhos  64  03-06      PT/INR - ( 06 Mar 2019 13:56 )   PT: 12.80 sec;   INR: 1.11 ratio         PTT - ( 06 Mar 2019 13:56 )  PTT:28.5 sec                                             LIVER FUNCTIONS - ( 06 Mar 2019 12:34 )  Alb: 3.4 g/dL / Pro: 5.9 g/dL / ALK PHOS: 64 U/L / ALT: 30 U/L / AST: 32 U/L / GGT: x                                              Cytopathology - Non Gyn Report:   ACCESSION No:  [id number]    EDILBERTO ARELLANO        Cytopathology Report            Specimen(s) Submitted  Left pleural fluid      Clinical History  Left pleural fluid with lung mass, smoker      Gross Description  The specimen is received fresh labeled with the patient's name  and consists of 70 ml of catrachito fluid. One monolayer slide (Thin  Prep) is prepared and stained with Papanicolaou stain. One cell  block is made.      Final Diagnosis  PLEURAL FLUID, LEFT  POSITIVE FOR MALIGNANT CELLS.  Metastatic adenocarcinoma.  (See note)    Note: Cytology slide and cell block show a hypercellular  specimen, displaying crowded 3-dimensional groups and single  lying malignant glandular cells, in a background of reactive  mesothelial cells, histiocytes and small lymphocytes. The  neoplastic cells are positive for TTF-1, calretinin (focal, weak)  and Moises-EP4 (focal, weak), while negative for CK7, CK20, CK5/6,  p63, Napsin-A and CD68 immunostains.  Reported lung mass and smoking history noted. The immunoprofile  is in favor of metastatic lung adenocarcinoma.  Testing for molecular biomarkers and PD-L1 in progress; results  will be reported in an addendum.    Screened by: Inés SAMPSON(ASCP)  Verified by: Gracie Chau M.D.  (Electronic Signature)  Reported on: 03/05/19 12:17 EST, [facility], [address]rd Floor,  [place] [address]  _________________________________________________________________      Comment  This case was reviewed in intradepartmental QAconference and the  diagnosis represents a consensus opinion. Dr. MARLYS Bourgeois was  informed of the findings on 3/5/19 by encrypted e-mail.  Case reported to Tumor Registry. (02.27.19 @ 09:23)                                                      MEDICATIONS  (STANDING):  ALBUTerol/ipratropium for Nebulization 3 milliLiter(s) Nebulizer every 6 hours  enoxaparin Injectable 40 milliGRAM(s) SubCutaneous daily  furosemide    Tablet 40 milliGRAM(s) Oral daily  sertraline 25 milliGRAM(s) Oral daily    MEDICATIONS  (PRN):  acetaminophen   Tablet .. 650 milliGRAM(s) Oral every 6 hours PRN Moderate Pain (4 - 6)  ALPRAZolam 0.25 milliGRAM(s) Oral three times a day PRN anxiety

## 2019-03-07 NOTE — CONSULT NOTE ADULT - SUBJECTIVE AND OBJECTIVE BOX
Patient is a 74y old  Female who presents with a chief complaint of SOB (07 Mar 2019 12:43)      HPI:  73 yo F with COPD (not on home O2) and anxiety presenting with worsening SOB and cough x10 days.  Pt was evaluated in ED on Feb 27 for same complaint. She had a L thoracentesis and was discharged home.  Pt had a CT chest as OP showing L lung mass with adjacent effusion suspicious for malignancy. results were never discussed with pt  Pt denies any fever or chills. + cough, worse at night time,+ orthopnea, + left sided flank/ LUQ pain.  Pt has bilateral LE edema. She lost 10 lbs in past 2 months. She has poor appetite   No other complaints (06 Mar 2019 23:42)    Heme/onc :   Patient is an active smoker . She had COPD , not on treatment, no O2 at home . She follows with Dr Abrams, had CXR every 6 months and screening CT scans. Recently she has been complaining of SOB , found to have pleural effusion . She had thoracocenthesis and it revealed lung adenocarcinoma.          PAST MEDICAL & SURGICAL HISTORY:  Lung mass  COPD (chronic obstructive pulmonary disease)  Anxiety  H/O hernia repair  S/P appendectomy      SOCIAL HISTORY: active smoker    FAMILY HISTORY:  No pertinent family history in first degree relatives    Allergies    codeine (Rash)    Intolerances              HOME MEDICATIONS:  albuterol 90 mcg/inh inhalation aerosol with adapter:  (06 Mar 2019 23:59)  ALPRAZolam 0.25 mg oral tablet: 1 tab(s) orally 3 times a day, As Needed (06 Mar 2019 23:59)  Anoro Ellipta 62.5 mcg-25 mcg/inh inhalation powder: 1 puff(s) inhaled once a day (06 Mar 2019 23:59)  furosemide 40 mg oral tablet:  (06 Mar 2019 23:59)  predniSONE: Taper (06 Mar 2019 23:59)  Zoloft 25 mg oral tablet:  (06 Mar 2019 23:59)      Vital Signs Last 24 Hrs  T(C): 36.6 (07 Mar 2019 15:51), Max: 36.8 (06 Mar 2019 17:14)  T(F): 97.9 (07 Mar 2019 15:51), Max: 98.3 (07 Mar 2019 00:00)  HR: 94 (07 Mar 2019 15:51) (78 - 96)  BP: 112/68 (07 Mar 2019 15:51) (104/59 - 120/70)  BP(mean): --  RR: 20 (07 Mar 2019 15:51) (19 - 20)  SpO2: 98% (07 Mar 2019 15:51) (92% - 98%)    PHYSICAL EXAM  General: adult in NAD - on nasal cannula  HEENT: clear oropharynx, anicteric sclera, pink conjunctiva  Neck: supple  CV: normal S1/S2   Lungs: Decreased air entry bilaterally  Abdomen: soft non-tender non-distended  Ext:Trace edema  Skin: no rashes and no petechiae  Neuro: alert and oriented X 4, no focal deficits    MEDICATIONS  (STANDING):  ALBUTerol/ipratropium for Nebulization 3 milliLiter(s) Nebulizer every 6 hours  enoxaparin Injectable 40 milliGRAM(s) SubCutaneous daily  furosemide    Tablet 40 milliGRAM(s) Oral daily  sertraline 25 milliGRAM(s) Oral daily    MEDICATIONS  (PRN):  acetaminophen   Tablet .. 650 milliGRAM(s) Oral every 6 hours PRN Moderate Pain (4 - 6)  ALPRAZolam 0.25 milliGRAM(s) Oral three times a day PRN anxiety      LABS:                          14.5   8.03  )-----------( 333      ( 07 Mar 2019 05:54 )             43.1         Mean Cell Volume : 94.1 fL  Mean Cell Hemoglobin : 31.7 pg  Mean Cell Hemoglobin Concentration : 33.6 g/dL  Auto Neutrophil # : 6.00 K/uL  Auto Lymphocyte # : 1.00 K/uL  Auto Monocyte # : 0.76 K/uL  Auto Eosinophil # : 0.15 K/uL  Auto Basophil # : 0.07 K/uL  Auto Neutrophil % : 74.6 %  Auto Lymphocyte % : 12.5 %  Auto Monocyte % : 9.5 %  Auto Eosinophil % : 1.9 %  Auto Basophil % : 0.9 %      Serial CBC's  03-07 @ 05:54  Hct-43.1 / Hgb-14.5 / Plat-333 / RBC-4.58 / WBC-8.03  Serial CBC's  03-06 @ 12:34  Hct-43.0 / Hgb-14.8 / Plat-304 / RBC-4.57 / WBC-10.24      03-07    143  |  101  |  22<H>  ----------------------------<  101<H>  4.6   |  32  |  0.7    Ca    8.8      07 Mar 2019 05:54    TPro  5.9<L>  /  Alb  3.4<L>  /  TBili  0.3  /  DBili  x   /  AST  32  /  ALT  30  /  AlkPhos  64  03-06      PT/INR - ( 06 Mar 2019 13:56 )   PT: 12.80 sec;   INR: 1.11 ratio         PTT - ( 06 Mar 2019 13:56 )  PTT:28.5 sec    RADIOLOGY & ADDITIONAL STUDIES:  < from: Xray Chest 1 View-PORTABLE IMMEDIATE (03.07.19 @ 16:01) >    Impression:      Left lower lobe opacification and effusionwithout difference.          < end of copied text >    < from: CT Angio Chest w/ IV Cont (03.06.19 @ 19:58) >  IMPRESSION:     No evidence for acute pulmonary embolism. Left hilar mass encases and   narrows the left main pulmonary artery and subsequent branches.    Interval increase in size of a left pleural effusion and compressive   atelectasis, now moderate to large.    Redemonstration of a 5 cm left upper lobe consolidation compatible with   patient's mass or postobstructive consolidation. Clinical correlation   would be needed to exclude infection.        < end of copied text >    < from: CT Chest No Cont (02.22.19 @ 18:30) >    IMPRESSION:    Left lingular and left upper lobe consolidation and interstitial   thickening. Likely left hilar mass and mediastinal lymphadenopathy   however evaluation is limited secondary to lack of intravenous contrast.   Repeat CT with intravenous contrast is recommended. Correlation with more   recent imaging is recommended, if available.    Left moderate to large pleural effusion.      < end of copied text >    Cytopathology - Non Gyn Report (02.27.19 @ 09:23)    Cytopathology - Non Gyn Report:   ACCESSION No:  [id number]    EDILBERTO ARELLANO                      1        Cytopathology Report            Specimen(s) Submitted  Left pleural fluid      Clinical History  Left pleural fluid with lung mass, smoker      Gross Description  The specimen is received fresh labeled with the patient's name  and consists of 70 ml of catrachito fluid. One monolayer slide (Thin  Prep) is prepared and stained with Papanicolaou stain. One cell  block is made.      Final Diagnosis  PLEURAL FLUID, LEFT  POSITIVE FOR MALIGNANT CELLS.  Metastatic adenocarcinoma.  (See note)    Note: Cytology slide and cell block show a hypercellular  specimen, displaying crowded 3-dimensional groups and single  lying malignant glandular cells, in a background of reactive  mesothelial cells, histiocytes and small lymphocytes. The  neoplastic cells are positive for TTF-1, calretinin (focal, weak)  and Moises-EP4 (focal, weak), while negative for CK7, CK20, CK5/6,  p63, Napsin-A and CD68 immunostains.  Reported lung mass and smoking history noted. The immunoprofile  is in favor of metastatic lung adenocarcinoma.  Testing for molecular biomarkers and PD-L1 in progress; results  will be reported in an addendum.    Screened by: Inés SAMPSON(ASCP)  Verified by: Gracie Chau M.D.  (Electronic Signature)  Reported on: 03/05/19 12:17 EST, [facility], [address]rd Floor,  [place] [address]  _________________________________________________________________      Comment  This case was reviewed in intradepartmental QAconference and the  diagnosis represents a consensus opinion. Dr. MARLYS Bourgeois was  informed of the findings on 3/5/19 by encrypted e-mail.  Case reported to Tumor Registry.

## 2019-03-07 NOTE — PROGRESS NOTE ADULT - SUBJECTIVE AND OBJECTIVE BOX
SUBJECTIVE:    Patient is a 74y old Female who presents with a chief complaint of SOB (06 Mar 2019 23:42)    Currently admitted to medicine with the primary diagnosis of Pleural effusion on left     Today is hospital day 1d. This morning she reports no new issues or overnight events. Patient complains of pain in the LUQ of abdomen.     PAST MEDICAL & SURGICAL HISTORY  Lung mass  COPD (chronic obstructive pulmonary disease)  Anxiety  H/O hernia repair  S/P appendectomy    SOCIAL HISTORY:  Negative for smoking/alcohol/drug use.     ALLERGIES:  codeine (Rash)    MEDICATIONS:  STANDING MEDICATIONS  ALBUTerol/ipratropium for Nebulization 3 milliLiter(s) Nebulizer every 6 hours  furosemide    Tablet 40 milliGRAM(s) Oral daily  heparin  Injectable 5000 Unit(s) SubCutaneous every 12 hours  sertraline 25 milliGRAM(s) Oral daily    PRN MEDICATIONS  acetaminophen   Tablet .. 650 milliGRAM(s) Oral every 6 hours PRN  ALPRAZolam 0.25 milliGRAM(s) Oral three times a day PRN    VITALS:   T(F): 97.6  HR: 96  BP: 120/70  RR: 20  SpO2: 96%    LABS:                        14.5   8.03  )-----------( 333      ( 07 Mar 2019 05:54 )             43.1     03-07    143  |  101  |  22<H>  ----------------------------<  101<H>  4.6   |  32  |  0.7    Ca    8.8      07 Mar 2019 05:54    TPro  5.9<L>  /  Alb  3.4<L>  /  TBili  0.3  /  DBili  x   /  AST  32  /  ALT  30  /  AlkPhos  64  03-06    PT/INR - ( 06 Mar 2019 13:56 )   PT: 12.80 sec;   INR: 1.11 ratio         PTT - ( 06 Mar 2019 13:56 )  PTT:28.5 sec      PHYSICAL EXAM:  GEN: No acute distress  LUNGS: decreased breath sound on the left lung base  HEART: S1/S2 present. RRR.   ABD: Soft, tenderness noted in the left upper quadrant, non-distended. Bowel sounds present  EXT: NC/NC/NE/2+PP/LARSEN  NEURO: AAOX3

## 2019-03-07 NOTE — CONSULT NOTE ADULT - ATTENDING COMMENTS
pt seen/examined; note reviewed ;agree with assessment and recommendations    explained diagnosis, extent, options of management  chemo as outpatient once will confirm no brain involovment: will get MRI brain either inpatient or outpatient;   pet scan as outpatient  f/u final pathology report; will guide chemo options  f/u me as outpatient in one week

## 2019-03-08 PROBLEM — R91.8 OTHER NONSPECIFIC ABNORMAL FINDING OF LUNG FIELD: Chronic | Status: ACTIVE | Noted: 2019-03-07

## 2019-03-08 PROBLEM — Z00.00 ENCOUNTER FOR PREVENTIVE HEALTH EXAMINATION: Status: ACTIVE | Noted: 2019-03-08

## 2019-03-08 LAB
ALBUMIN FLD-MCNC: 1.8 G/DL — SIGNIFICANT CHANGE UP
ANION GAP SERPL CALC-SCNC: 14 MMOL/L — SIGNIFICANT CHANGE UP (ref 7–14)
BUN SERPL-MCNC: 14 MG/DL — SIGNIFICANT CHANGE UP (ref 10–20)
CALCIUM SERPL-MCNC: 8.5 MG/DL — SIGNIFICANT CHANGE UP (ref 8.5–10.1)
CHLORIDE SERPL-SCNC: 96 MMOL/L — LOW (ref 98–110)
CO2 SERPL-SCNC: 29 MMOL/L — SIGNIFICANT CHANGE UP (ref 17–32)
CREAT SERPL-MCNC: 0.6 MG/DL — LOW (ref 0.7–1.5)
GLUCOSE FLD-MCNC: 140 MG/DL — SIGNIFICANT CHANGE UP
GLUCOSE SERPL-MCNC: 265 MG/DL — HIGH (ref 70–99)
HCT VFR BLD CALC: 44.2 % — SIGNIFICANT CHANGE UP (ref 37–47)
HGB BLD-MCNC: 15.1 G/DL — SIGNIFICANT CHANGE UP (ref 12–16)
LDH SERPL L TO P-CCNC: 243 U/L — SIGNIFICANT CHANGE UP
MCHC RBC-ENTMCNC: 31.7 PG — HIGH (ref 27–31)
MCHC RBC-ENTMCNC: 34.2 G/DL — SIGNIFICANT CHANGE UP (ref 32–37)
MCV RBC AUTO: 92.9 FL — SIGNIFICANT CHANGE UP (ref 81–99)
NRBC # BLD: 0 /100 WBCS — SIGNIFICANT CHANGE UP (ref 0–0)
PH FLD: 6 — SIGNIFICANT CHANGE UP
PLATELET # BLD AUTO: 310 K/UL — SIGNIFICANT CHANGE UP (ref 130–400)
POTASSIUM SERPL-MCNC: 3.7 MMOL/L — SIGNIFICANT CHANGE UP (ref 3.5–5)
POTASSIUM SERPL-SCNC: 3.7 MMOL/L — SIGNIFICANT CHANGE UP (ref 3.5–5)
PROT FLD-MCNC: 3.2 G/DL — SIGNIFICANT CHANGE UP
RBC # BLD: 4.76 M/UL — SIGNIFICANT CHANGE UP (ref 4.2–5.4)
RBC # FLD: 12.1 % — SIGNIFICANT CHANGE UP (ref 11.5–14.5)
SODIUM SERPL-SCNC: 139 MMOL/L — SIGNIFICANT CHANGE UP (ref 135–146)
WBC # BLD: 9.21 K/UL — SIGNIFICANT CHANGE UP (ref 4.8–10.8)
WBC # FLD AUTO: 9.21 K/UL — SIGNIFICANT CHANGE UP (ref 4.8–10.8)

## 2019-03-08 RX ORDER — ACETAMINOPHEN 500 MG
650 TABLET ORAL EVERY 6 HOURS
Qty: 0 | Refills: 0 | Status: DISCONTINUED | OUTPATIENT
Start: 2019-03-08 | End: 2019-03-12

## 2019-03-08 RX ADMIN — Medication 3 MILLILITER(S): at 13:17

## 2019-03-08 RX ADMIN — Medication 3 MILLILITER(S): at 19:25

## 2019-03-08 RX ADMIN — Medication 0.25 MILLIGRAM(S): at 21:51

## 2019-03-08 RX ADMIN — Medication 0.25 MILLIGRAM(S): at 00:39

## 2019-03-08 RX ADMIN — CHLORHEXIDINE GLUCONATE 1 APPLICATION(S): 213 SOLUTION TOPICAL at 05:56

## 2019-03-08 RX ADMIN — SERTRALINE 25 MILLIGRAM(S): 25 TABLET, FILM COATED ORAL at 11:14

## 2019-03-08 RX ADMIN — Medication 40 MILLIGRAM(S): at 05:56

## 2019-03-08 RX ADMIN — Medication 3 MILLILITER(S): at 07:29

## 2019-03-08 NOTE — PROGRESS NOTE ADULT - ASSESSMENT
73 yo F with COPD (not on home O2) and anxiety presenting with worsening SOB and cough x10 days s/p thoracentesis on 2/27 found to have metastatic adenocarcinoma     # Acute hypoxic resp failure 2/2 L pleural effusion 2/2 malignancy  - supplemental O2 through [place]  - pt needs denver catheter for recurring pleural effusion-> this will be done Monday  - f/u pulm recs     # L Lung mass likely to be adenocarcinoma with mets to the liver  - CT chest showing ANTON consolidation  - f/u MRI w/ and w/o contrast to r/o brain mets   - PET scan OP  - pt has appointment w/Dr. Sanchez on 3/15 10:00 am     # COPD  - no wheezing  - pt completed prednisone taper   - pt states she quit smoking when she became sick  - duonebs prn    # Anxiety/ depression  - c/w sertraline  - xanax prn     # GI/ DVT ppx not indicated/ heparin  Code: Full code   Dispo: home once denver catheter placed

## 2019-03-08 NOTE — PROGRESS NOTE ADULT - SUBJECTIVE AND OBJECTIVE BOX
EDILBERTO ARELLANO  74y  Female      Patient is a 74y old  Female who presents with a chief complaint of SOB (08 Mar 2019 13:51)      INTERVAL HPI/OVERNIGHT EVENTS:      ******************************* REVIEW OF SYSTEMS:**********************************************      All other review of systems negative    *********************** VITALS ******************************************    T(F): 98.5 (03-08-19 @ 12:37)  HR: 55 (03-08-19 @ 12:37) (55 - 104)  BP: 109/58 (03-08-19 @ 12:37) (94/61 - 113/66)  RR: 18 (03-08-19 @ 12:37) (18 - 20)  SpO2: 89% (03-08-19 @ 10:38) (89% - 98%)            ******************************** PHYSICAL EXAM:**************************************************  GENERAL: NAD    PSYCH: no agitation, baseline mentation  HEENT:     NERVOUS SYSTEM:  Alert & Oriented X3, MS  5/5 B/L  UE and LE ; Sensory intact    PULMONARY: decreased  SUJEY, Crackles over left lung.    CARDIOVASCULAR: S1S2 RRR    GI: Soft, NT, ND; BS present.    EXTREMITIES:  2+ Peripheral Pulses, No clubbing, cyanosis, or edema    LYMPH: No lymphadenopathy noted    SKIN: No rashes or lesions    ******************************************************************************************    Consultant(s) Notes Reviewed:  [x ] YES  [ ] NO    Discussed with Consultants/Other Providers [ x] YES     **************************** LABS *******************************************************                          15.1   9.21  )-----------( 310      ( 08 Mar 2019 09:22 )             44.2     03-08    139  |  96<L>  |  14  ----------------------------<  265<H>  3.7   |  29  |  0.6<L>    Ca    8.5      08 Mar 2019 09:22            Lactate Trend        CAPILLARY BLOOD GLUCOSE              **************************Active Medications *******************************************  codeine (Rash)      acetaminophen   Tablet .. 650 milliGRAM(s) Oral every 6 hours PRN  acetaminophen   Tablet .. 650 milliGRAM(s) Oral every 6 hours PRN  ALBUTerol/ipratropium for Nebulization 3 milliLiter(s) Nebulizer every 6 hours  ALPRAZolam 0.25 milliGRAM(s) Oral three times a day PRN  chlorhexidine 4% Liquid 1 Application(s) Topical <User Schedule>  enoxaparin Injectable 40 milliGRAM(s) SubCutaneous daily  furosemide    Tablet 40 milliGRAM(s) Oral daily  sertraline 25 milliGRAM(s) Oral daily      ***************************************************  RADIOLOGY & ADDITIONAL TESTS:    Imaging Personally Reviewed:  [ ] YES  [ ] NO    HEALTH ISSUES - PROBLEM Dx:

## 2019-03-08 NOTE — PROGRESS NOTE ADULT - SUBJECTIVE AND OBJECTIVE BOX
OVERNIGHT EVENTS: None    Subjective: pt s/e at bedside, pt requiring O2 for SOB. Pt is aware of her current dx    HISTORY OF PRESENTING ILLNESS:   Outpatient Providers	Pulm: Dr zarate	    73 yo F with COPD (not on home O2) and anxiety presenting with worsening SOB and cough x10 days.  Pt was evaluated in ED on Feb 27 for same complaint. She had a L thoracentesis and was discharged home.  Pt had a CT chest as OP showing L lung mass with adjacent effusion suspicious for malignancy. results were never discussed with pt  Pt denies any fever or chills. + cough, worse at night time,+ orthopnea, + left sided flank/ LUQ pain.  Pt has bilateral LE edema. She lost 10 lbs in past 2 months. She has poor appetite   No other complaints   Past Medical History:  Anxiety    COPD (chronic obstructive pulmonary disease)    Lung mass.    Past Surgical History:  H/O hernia repair    S/P appendectomy.  MEDICATIONS:  STANDING MEDICATIONS  ALBUTerol/ipratropium for Nebulization 3 milliLiter(s) Nebulizer every 6 hours  chlorhexidine 4% Liquid 1 Application(s) Topical <User Schedule>  enoxaparin Injectable 40 milliGRAM(s) SubCutaneous daily  furosemide    Tablet 40 milliGRAM(s) Oral daily  sertraline 25 milliGRAM(s) Oral daily    PRN MEDICATIONS  acetaminophen   Tablet .. 650 milliGRAM(s) Oral every 6 hours PRN  acetaminophen   Tablet .. 650 milliGRAM(s) Oral every 6 hours PRN  ALPRAZolam 0.25 milliGRAM(s) Oral three times a day PRN    VITALS:   T(F): 98.5  HR: 55  BP: 109/58  RR: 18  SpO2: 89%    LABS:                        15.1   9.21  )-----------( 310      ( 08 Mar 2019 09:22 )             44.2     03-08    139  |  96<L>  |  14  ----------------------------<  265<H>  3.7   |  29  |  0.6<L>    Ca    8.5      08 Mar 2019 09:22      PT/INR - ( 06 Mar 2019 13:56 )   PT: 12.80 sec;   INR: 1.11 ratio         PTT - ( 06 Mar 2019 13:56 )  PTT:28.5 sec          Culture - Body Fluid with Gram Stain (collected 07 Mar 2019 12:26)  Source: .Body Fluid Pleural Fluid  Gram Stain (07 Mar 2019 22:44):    polymorphonuclear leukocytes seen    No organisms seen    by cytocentrifuge          RADIOLOGY:  < from: CT Chest No Cont (02.22.19 @ 18:30) >  MPRESSION:    Left lingular and left upper lobe consolidation and interstitial   thickening. Likely left hilar mass and mediastinal lymphadenopathy   however evaluation is limited secondary to lack of intravenous contrast.   Repeat CT with intravenous contrast is recommended. Correlation with more   recent imaging is recommended, if available.    Left moderate to large pleural effusion.      PHYSICAL EXAM:    GENERAL: No acute distress, well-developed  HEAD:  Atraumatic, Normocephalic  EYES: EOMI, PERRLA, conjunctiva and sclera clear  NECK: Supple, no lymphadenopathy, no JVD  CHEST/LUNG: CTAB; No wheezes, rales, or rhonchi, dec air entry  HEART: Regular rate and rhythm; No murmurs, rubs, or gallops  ABDOMEN: Soft, non-tender, non-distended; normal bowel sounds, no organomegaly  EXTREMITIES:  2+ peripheral pulses b/l, No clubbing, cyanosis, or edema  NEUROLOGY: A&O x 3, no focal deficits  SKIN: No rashes or lesions

## 2019-03-08 NOTE — PROGRESS NOTE ADULT - ASSESSMENT
73 yo F with COPD (not on home O2) and anxiety presenting with worsening SOB and cough x10 days s/p thoracentesis on 2/27 found to have metastatic adenocarcinoma     # Acute hypoxic resp failure 2/2 L pleural effusion 2/2 malignancy  - supplemental O2 through [place]  - pt needs [place] catheter for recurring pleural effusion-> this will be done Monday  - f/u pulm recs     # L Lung mass likely to be adenocarcinoma with mets to the liver  - CT chest showing ANTON consolidation  - f/u MRI w/ and w/o contrast to r/o brain mets   - PET scan OP  - pt has appointment w/Dr. Sanchez on 3/15 10:00 am    # Fluctuating Pulse rate >> Would check 12 lead EKG.     # Advance COPD  - no wheezing  - pt completed prednisone taper   - pt states she quit smoking when she became sick  - duonebs prn    # Anxiety/ depression  - c/w sertraline  - xanax prn     # GI/ DVT ppx not indicated/ heparin  Code: Full code   Dispo: home once denver catheter placed    #Progress Note Handoff  Pending (specify):  DENVER cath possible on Monday.    Disposition: Home

## 2019-03-08 NOTE — PROGRESS NOTE ADULT - ASSESSMENT
IMPRESSION:    Left sided pleural effusion - due to adenocarcinoma s/p thoracentesis recurrent  COPD not in exacerbation    RECOMMENDATIONS:      pleurx catheter  Hem/Onc evaluation  pox ra and on ambulation/ op f/up   Overall prognosis is poor  Case discusses extensively with

## 2019-03-08 NOTE — PROGRESS NOTE ADULT - SUBJECTIVE AND OBJECTIVE BOX
OVERNIGHT EVENTS: still c/o SOB    Vital Signs Last 24 Hrs  T(C): 36.9 (08 Mar 2019 12:37), Max: 36.9 (08 Mar 2019 12:37)  T(F): 98.5 (08 Mar 2019 12:37), Max: 98.5 (08 Mar 2019 12:37)  HR: 55 (08 Mar 2019 12:37) (55 - 104)  BP: 109/58 (08 Mar 2019 12:37) (94/61 - 113/66)  RR: 18 (08 Mar 2019 12:37) (18 - 20)  SpO2: 89% (08 Mar 2019 10:38) (89% - 98%)    PHYSICAL EXAMINATION:    GENERAL: CACHECTIC     HEENT: Head is normocephalic and atraumatic. Extraocular muscles are intact. Mucous membranes are moist.    NECK: Supple.    LUNGS: DEC BS L SIDE    HEART: Regular rate and rhythm without murmur.    ABDOMEN: Soft, nontender, and nondistended.      EXTREMITIES: Without any cyanosis, clubbing, rash, lesions or edema.    NEUROLOGIC: Grossly intact.    SKIN: No ulceration or induration present.      LABS:                        15.1   9.21  )-----------( 310      ( 08 Mar 2019 09:22 )             44.2     03-08    139  |  96<L>  |  14  ----------------------------<  265<H>  3.7   |  29  |  0.6<L>    Ca    8.5      08 Mar 2019 09:22                              MICROBIOLOGY:      MEDICATIONS  (STANDING):  ALBUTerol/ipratropium for Nebulization 3 milliLiter(s) Nebulizer every 6 hours  chlorhexidine 4% Liquid 1 Application(s) Topical <User Schedule>  enoxaparin Injectable 40 milliGRAM(s) SubCutaneous daily  furosemide    Tablet 40 milliGRAM(s) Oral daily  sertraline 25 milliGRAM(s) Oral daily    MEDICATIONS  (PRN):  acetaminophen   Tablet .. 650 milliGRAM(s) Oral every 6 hours PRN Moderate Pain (4 - 6)  acetaminophen   Tablet .. 650 milliGRAM(s) Oral every 6 hours PRN Temp greater or equal to 38C (100.4F), Mild Pain (1 - 3)  ALPRAZolam 0.25 milliGRAM(s) Oral three times a day PRN anxiety      RADIOLOGY & ADDITIONAL STUDIES:

## 2019-03-09 LAB
ANION GAP SERPL CALC-SCNC: 11 MMOL/L — SIGNIFICANT CHANGE UP (ref 7–14)
BUN SERPL-MCNC: 13 MG/DL — SIGNIFICANT CHANGE UP (ref 10–20)
CALCIUM SERPL-MCNC: 8.6 MG/DL — SIGNIFICANT CHANGE UP (ref 8.5–10.1)
CHLORIDE SERPL-SCNC: 96 MMOL/L — LOW (ref 98–110)
CO2 SERPL-SCNC: 32 MMOL/L — SIGNIFICANT CHANGE UP (ref 17–32)
CREAT SERPL-MCNC: 0.6 MG/DL — LOW (ref 0.7–1.5)
GLUCOSE SERPL-MCNC: 191 MG/DL — HIGH (ref 70–99)
HCT VFR BLD CALC: 42.4 % — SIGNIFICANT CHANGE UP (ref 37–47)
HGB BLD-MCNC: 14.6 G/DL — SIGNIFICANT CHANGE UP (ref 12–16)
MCHC RBC-ENTMCNC: 31.8 PG — HIGH (ref 27–31)
MCHC RBC-ENTMCNC: 34.4 G/DL — SIGNIFICANT CHANGE UP (ref 32–37)
MCV RBC AUTO: 92.4 FL — SIGNIFICANT CHANGE UP (ref 81–99)
NRBC # BLD: 0 /100 WBCS — SIGNIFICANT CHANGE UP (ref 0–0)
PLATELET # BLD AUTO: 301 K/UL — SIGNIFICANT CHANGE UP (ref 130–400)
POTASSIUM SERPL-MCNC: 3.8 MMOL/L — SIGNIFICANT CHANGE UP (ref 3.5–5)
POTASSIUM SERPL-SCNC: 3.8 MMOL/L — SIGNIFICANT CHANGE UP (ref 3.5–5)
RBC # BLD: 4.59 M/UL — SIGNIFICANT CHANGE UP (ref 4.2–5.4)
RBC # FLD: 12.2 % — SIGNIFICANT CHANGE UP (ref 11.5–14.5)
SODIUM SERPL-SCNC: 139 MMOL/L — SIGNIFICANT CHANGE UP (ref 135–146)
WBC # BLD: 8.19 K/UL — SIGNIFICANT CHANGE UP (ref 4.8–10.8)
WBC # FLD AUTO: 8.19 K/UL — SIGNIFICANT CHANGE UP (ref 4.8–10.8)

## 2019-03-09 PROCEDURE — 93970 EXTREMITY STUDY: CPT | Mod: 26

## 2019-03-09 RX ADMIN — Medication 3 MILLILITER(S): at 16:05

## 2019-03-09 RX ADMIN — Medication 0.25 MILLIGRAM(S): at 21:25

## 2019-03-09 RX ADMIN — SERTRALINE 25 MILLIGRAM(S): 25 TABLET, FILM COATED ORAL at 11:07

## 2019-03-09 RX ADMIN — CHLORHEXIDINE GLUCONATE 1 APPLICATION(S): 213 SOLUTION TOPICAL at 05:28

## 2019-03-09 RX ADMIN — Medication 3 MILLILITER(S): at 20:35

## 2019-03-09 RX ADMIN — Medication 40 MILLIGRAM(S): at 05:28

## 2019-03-09 NOTE — PROGRESS NOTE ADULT - ASSESSMENT
75 yo F with COPD (not on home O2) and anxiety presenting with worsening SOB and cough x10 days s/p thoracentesis on 2/27 found to have metastatic adenocarcinoma     # Acute hypoxic resp failure 2/2 L pleural effusion 2/2 malignancy  - supplemental O2 through [place]  - pt needs [place] catheter for recurring pleural effusion-> this will be done Monday  - f/u pulm recs     # L Lung mass likely to be adenocarcinoma with mets to the liver  - CT chest showing ANTON consolidation  - f/u MRI w/ and w/o contrast to r/o brain mets   - PET scan OP  - pt has appointment w/Dr. Sanchez on 3/15 10:00 am    # Fluctuating Pulse rate >> Would check 12 lead EKG >>NSR     # Advance COPD  - no wheezing  - pt completed prednisone taper   - pt states she quit smoking when she became sick  - duonebs prn    # Anxiety/ depression  - c/w sertraline  - xanax prn     # GI/ DVT ppx not indicated/ heparin  Code: Full code   Dispo: home once denver catheter placed    #Progress Note Handoff  Pending (specify):  DEN[place] cath possible on Monday.    Disposition: Home

## 2019-03-09 NOTE — PROGRESS NOTE ADULT - SUBJECTIVE AND OBJECTIVE BOX
EDILBERTO ARELLANO  74y  Female      Patient is a 74y old  Female who presents with a chief complaint of SOB (08 Mar 2019 17:32)      INTERVAL HPI/OVERNIGHT EVENTS:      ******************************* REVIEW OF SYSTEMS:**********************************************    All other review of systems negative    *********************** VITALS ******************************************    T(F): 97.9 (03-09-19 @ 12:30)  HR: 101 (03-09-19 @ 12:30) (68 - 101)  BP: 98/65 (03-09-19 @ 12:30) (98/65 - 117/59)  RR: 18 (03-09-19 @ 12:30) (18 - 20)  SpO2: 94% (03-09-19 @ 07:49) (94% - 95%)            ******************************** PHYSICAL EXAM:**************************************************  GENERAL: NAD    PSYCH: no agitation, baseline mentation  HEENT:     NERVOUS SYSTEM:  Alert & Oriented X3, MS  5/5 B/L  UE and LE ; Sensory intact    PULMONARY: decreased air entry Left side, CTA    CARDIOVASCULAR: S1S2 RRR    GI: Soft, NT, ND; BS present.    EXTREMITIES:  2+ Peripheral Pulses, No clubbing, cyanosis, or edema    LYMPH: No lymphadenopathy noted    SKIN: No rashes or lesions    ******************************************************************************************    Consultant(s) Notes Reviewed:  [x ] YES  [ ] NO    Discussed with Consultants/Other Providers [ x] YES     **************************** LABS *******************************************************                          14.6   8.19  )-----------( 301      ( 09 Mar 2019 08:50 )             42.4     03-09    139  |  96<L>  |  13  ----------------------------<  191<H>  3.8   |  32  |  0.6<L>    Ca    8.6      09 Mar 2019 08:50            Lactate Trend        CAPILLARY BLOOD GLUCOSE              **************************Active Medications *******************************************  codeine (Rash)      acetaminophen   Tablet .. 650 milliGRAM(s) Oral every 6 hours PRN  acetaminophen   Tablet .. 650 milliGRAM(s) Oral every 6 hours PRN  ALBUTerol/ipratropium for Nebulization 3 milliLiter(s) Nebulizer every 6 hours  ALPRAZolam 0.25 milliGRAM(s) Oral three times a day PRN  chlorhexidine 4% Liquid 1 Application(s) Topical <User Schedule>  enoxaparin Injectable 40 milliGRAM(s) SubCutaneous daily  furosemide    Tablet 40 milliGRAM(s) Oral daily  sertraline 25 milliGRAM(s) Oral daily      ***************************************************  RADIOLOGY & ADDITIONAL TESTS:    Imaging Personally Reviewed:  [ ] YES  [ ] NO    HEALTH ISSUES - PROBLEM Dx:

## 2019-03-10 LAB
ANION GAP SERPL CALC-SCNC: 10 MMOL/L — SIGNIFICANT CHANGE UP (ref 7–14)
BUN SERPL-MCNC: 14 MG/DL — SIGNIFICANT CHANGE UP (ref 10–20)
CALCIUM SERPL-MCNC: 8.7 MG/DL — SIGNIFICANT CHANGE UP (ref 8.5–10.1)
CHLORIDE SERPL-SCNC: 96 MMOL/L — LOW (ref 98–110)
CO2 SERPL-SCNC: 33 MMOL/L — HIGH (ref 17–32)
CREAT SERPL-MCNC: 0.6 MG/DL — LOW (ref 0.7–1.5)
GLUCOSE BLDC GLUCOMTR-MCNC: 142 MG/DL — HIGH (ref 70–99)
GLUCOSE SERPL-MCNC: 124 MG/DL — HIGH (ref 70–99)
HCT VFR BLD CALC: 43.9 % — SIGNIFICANT CHANGE UP (ref 37–47)
HGB BLD-MCNC: 14.9 G/DL — SIGNIFICANT CHANGE UP (ref 12–16)
MCHC RBC-ENTMCNC: 31.8 PG — HIGH (ref 27–31)
MCHC RBC-ENTMCNC: 33.9 G/DL — SIGNIFICANT CHANGE UP (ref 32–37)
MCV RBC AUTO: 93.8 FL — SIGNIFICANT CHANGE UP (ref 81–99)
NRBC # BLD: 0 /100 WBCS — SIGNIFICANT CHANGE UP (ref 0–0)
PLATELET # BLD AUTO: 279 K/UL — SIGNIFICANT CHANGE UP (ref 130–400)
POTASSIUM SERPL-MCNC: 4 MMOL/L — SIGNIFICANT CHANGE UP (ref 3.5–5)
POTASSIUM SERPL-SCNC: 4 MMOL/L — SIGNIFICANT CHANGE UP (ref 3.5–5)
RBC # BLD: 4.68 M/UL — SIGNIFICANT CHANGE UP (ref 4.2–5.4)
RBC # FLD: 12.3 % — SIGNIFICANT CHANGE UP (ref 11.5–14.5)
SODIUM SERPL-SCNC: 139 MMOL/L — SIGNIFICANT CHANGE UP (ref 135–146)
WBC # BLD: 7.49 K/UL — SIGNIFICANT CHANGE UP (ref 4.8–10.8)
WBC # FLD AUTO: 7.49 K/UL — SIGNIFICANT CHANGE UP (ref 4.8–10.8)

## 2019-03-10 RX ORDER — LIDOCAINE 4 G/100G
1 CREAM TOPICAL DAILY
Qty: 0 | Refills: 0 | Status: DISCONTINUED | OUTPATIENT
Start: 2019-03-10 | End: 2019-03-12

## 2019-03-10 RX ADMIN — Medication 0.25 MILLIGRAM(S): at 19:56

## 2019-03-10 RX ADMIN — Medication 3 MILLILITER(S): at 08:59

## 2019-03-10 RX ADMIN — LIDOCAINE 1 PATCH: 4 CREAM TOPICAL at 16:14

## 2019-03-10 RX ADMIN — LIDOCAINE 1 PATCH: 4 CREAM TOPICAL at 18:59

## 2019-03-10 RX ADMIN — Medication 3 MILLILITER(S): at 20:11

## 2019-03-10 RX ADMIN — SERTRALINE 25 MILLIGRAM(S): 25 TABLET, FILM COATED ORAL at 11:04

## 2019-03-10 RX ADMIN — CHLORHEXIDINE GLUCONATE 1 APPLICATION(S): 213 SOLUTION TOPICAL at 05:34

## 2019-03-10 RX ADMIN — Medication 3 MILLILITER(S): at 13:35

## 2019-03-10 RX ADMIN — Medication 40 MILLIGRAM(S): at 05:34

## 2019-03-10 NOTE — PROGRESS NOTE ADULT - ASSESSMENT
73 yo F with COPD (not on home O2) and anxiety presenting with worsening SOB and cough x10 days s/p thoracentesis on 2/27 found to have metastatic adenocarcinoma     # Acute hypoxic resp failure 2/2 L pleural effusion 2/2 malignancy  - supplemental O2 through [place]  - pt needs [place] catheter for recurring pleural effusion-> this will be done Monday  - f/u pulm recs     # L Lung mass likely to be adenocarcinoma with mets to the liver  - CT chest showing ANTON consolidation  - f/u MRI w/ and w/o contrast to r/o brain mets   - PET scan OP  - pt has appointment w/Dr. Sanchez on 3/15 10:00 am     # COPD  - no wheezing  - pt completed prednisone taper   - pt states she quit smoking when she became sick  - duonebs prn    # Anxiety/ depression  - c/w sertraline  - xanax prn    GI/ DVT ppx not indicated/ heparin  Code: Full code   Dispo: home once den[place] catheter placed 75 yo F with COPD (not on home O2) and anxiety presenting with worsening SOB and cough x10 days s/p thoracentesis on 2/27 found to have metastatic adenocarcinoma     # Acute hypoxic resp failure 2/2 L pleural effusion 2/2 malignancy  - supplemental O2 through [place]  - pt needs [place] catheter for recurring pleural effusion-> this will be done Monday  - f/u pulm recs     # L Lung mass likely to be adenocarcinoma with mets to the liver  - CT chest showing ANTON consolidation  - f/u MRI w/ and w/o contrast to r/o brain mets- completed, f/u official read  - PET scan OP  - pt has appointment w/Dr. Sanchez on 3/15 10:00 am     # COPD  - no wheezing  - pt completed prednisone taper   - pt states she quit smoking when she became sick  - duonebs prn    # Anxiety/ depression  - c/w sertraline  - xanax prn    GI/ DVT ppx not indicated/ heparin  Code: Full code   Dispo: home once denver catheter placed

## 2019-03-10 NOTE — PROGRESS NOTE ADULT - SUBJECTIVE AND OBJECTIVE BOX
OVERNIGHT EVENTS: None    Subjective: pt s/e at bedside    HISTORY OF PRESENTING ILLNESS:   Outpatient Providers	Pulm: Dr zarate	    75 yo F with COPD (not on home O2) and anxiety presenting with worsening SOB and cough x10 days.  Pt was evaluated in ED on Feb 27 for same complaint. She had a L thoracentesis and was discharged home.  Pt had a CT chest as OP showing L lung mass with adjacent effusion suspicious for malignancy. results were never discussed with pt  Pt denies any fever or chills. + cough, worse at night time,+ orthopnea, + left sided flank/ LUQ pain.  Pt has bilateral LE edema. She lost 10 lbs in past 2 months. She has poor appetite   No other complaints   Past Medical History:  Anxiety    COPD (chronic obstructive pulmonary disease)    Lung mass.    Past Surgical History:  H/O hernia repair    S/P appendectomy.  MEDICATIONS:  STANDING MEDICATIONS  ALBUTerol/ipratropium for Nebulization 3 milliLiter(s) Nebulizer every 6 hours  chlorhexidine 4% Liquid 1 Application(s) Topical <User Schedule>  enoxaparin Injectable 40 milliGRAM(s) SubCutaneous daily  furosemide    Tablet 40 milliGRAM(s) Oral daily  sertraline 25 milliGRAM(s) Oral daily    PRN MEDICATIONS  acetaminophen   Tablet .. 650 milliGRAM(s) Oral every 6 hours PRN  acetaminophen   Tablet .. 650 milliGRAM(s) Oral every 6 hours PRN  ALPRAZolam 0.25 milliGRAM(s) Oral three times a day PRN    VITALS:   T(F): 98.3  HR: 100  BP: 119/73  RR: 19  SpO2: 96%    LABS:                        14.9   7.49  )-----------( 279      ( 10 Mar 2019 08:10 )             43.9     03-10    139  |  96<L>  |  14  ----------------------------<  124<H>  4.0   |  33<H>  |  0.6<L>    Ca    8.7      10 Mar 2019 08:10                Culture - Body Fluid with Gram Stain (collected 07 Mar 2019 12:26)  Source: .Body Fluid Pleural Fluid  Gram Stain (07 Mar 2019 22:44):    polymorphonuclear leukocytes seen    No organisms seen    by cytocentrifuge  Preliminary Report (08 Mar 2019 18:27):    No growth          RADIOLOGY:  < from: CT Chest No Cont (02.22.19 @ 18:30) >  MPRESSION:    Left lingular and left upper lobe consolidation and interstitial   thickening. Likely left hilar mass and mediastinal lymphadenopathy   however evaluation is limited secondary to lack of intravenous contrast.   Repeat CT with intravenous contrast is recommended. Correlation with more   recent imaging is recommended, if available.    Left moderate to large pleural effusion.      PHYSICAL EXAM:    GENERAL: No acute distress, well-developed  HEAD:  Atraumatic, Normocephalic  EYES: EOMI, PERRLA, conjunctiva and sclera clear  NECK: Supple, no lymphadenopathy, no JVD  CHEST/LUNG: CTAB; No wheezes, rales, or rhonchi, dec air entry  HEART: Regular rate and rhythm; No murmurs, rubs, or gallops  ABDOMEN: Soft, non-tender, non-distended; normal bowel sounds, no organomegaly  EXTREMITIES:  2+ peripheral pulses b/l, No clubbing, cyanosis, or edema  NEUROLOGY: A&O x 3, no focal deficits  SKIN: No rashes or lesions

## 2019-03-10 NOTE — PROGRESS NOTE ADULT - SUBJECTIVE AND OBJECTIVE BOX
EDILBERTO ARELLANO  74y  Female      Patient is a 74y old  Female who presents with a chief complaint of SOB (10 Mar 2019 12:07)      INTERVAL HPI/OVERNIGHT EVENTS:      ******************************* REVIEW OF SYSTEMS:**********************************************      All other review of systems negative    *********************** VITALS ******************************************    T(F): 96.7 (03-10-19 @ 12:38)  HR: 101 (03-10-19 @ 12:38) (92 - 101)  BP: 105/63 (03-10-19 @ 12:38) (105/63 - 119/73)  RR: 18 (03-10-19 @ 12:38) (18 - 19)  SpO2: 96% (03-10-19 @ 07:41) (84% - 96%)            ******************************** PHYSICAL EXAM:**************************************************  GENERAL: NAD    PSYCH: no agitation, baseline mentation  HEENT:     NERVOUS SYSTEM:  Alert & Oriented X3,     PULMONARY: Decreased air entry on left , CTA    CARDIOVASCULAR: S1S2 RRR    GI: Soft, NT, ND; BS present.    EXTREMITIES:  2+ Peripheral Pulses, No clubbing, cyanosis, or edema    LYMPH: No lymphadenopathy noted    SKIN: No rashes or lesions    ******************************************************************************************    Consultant(s) Notes Reviewed:  [x ] YES  [ ] NO    Discussed with Consultants/Other Providers [ x] YES     **************************** LABS *******************************************************                          14.9   7.49  )-----------( 279      ( 10 Mar 2019 08:10 )             43.9     03-10    139  |  96<L>  |  14  ----------------------------<  124<H>  4.0   |  33<H>  |  0.6<L>    Ca    8.7      10 Mar 2019 08:10            Lactate Trend        CAPILLARY BLOOD GLUCOSE              **************************Active Medications *******************************************  codeine (Rash)      acetaminophen   Tablet .. 650 milliGRAM(s) Oral every 6 hours PRN  acetaminophen   Tablet .. 650 milliGRAM(s) Oral every 6 hours PRN  ALBUTerol/ipratropium for Nebulization 3 milliLiter(s) Nebulizer every 6 hours  ALPRAZolam 0.25 milliGRAM(s) Oral three times a day PRN  chlorhexidine 4% Liquid 1 Application(s) Topical <User Schedule>  enoxaparin Injectable 40 milliGRAM(s) SubCutaneous daily  furosemide    Tablet 40 milliGRAM(s) Oral daily  sertraline 25 milliGRAM(s) Oral daily      ***************************************************  RADIOLOGY & ADDITIONAL TESTS:    Imaging Personally Reviewed:  [ ] YES  [ ] NO    HEALTH ISSUES - PROBLEM Dx:

## 2019-03-11 LAB — NON-GYNECOLOGICAL CYTOLOGY STUDY: SIGNIFICANT CHANGE UP

## 2019-03-11 RX ORDER — TRAMADOL HYDROCHLORIDE 50 MG/1
50 TABLET ORAL EVERY 8 HOURS
Qty: 0 | Refills: 0 | Status: DISCONTINUED | OUTPATIENT
Start: 2019-03-11 | End: 2019-03-12

## 2019-03-11 RX ORDER — MORPHINE SULFATE 50 MG/1
2 CAPSULE, EXTENDED RELEASE ORAL EVERY 6 HOURS
Qty: 0 | Refills: 0 | Status: DISCONTINUED | OUTPATIENT
Start: 2019-03-11 | End: 2019-03-11

## 2019-03-11 RX ADMIN — LIDOCAINE 1 PATCH: 4 CREAM TOPICAL at 22:50

## 2019-03-11 RX ADMIN — LIDOCAINE 1 PATCH: 4 CREAM TOPICAL at 11:06

## 2019-03-11 RX ADMIN — LIDOCAINE 1 PATCH: 4 CREAM TOPICAL at 20:09

## 2019-03-11 RX ADMIN — Medication 650 MILLIGRAM(S): at 14:28

## 2019-03-11 RX ADMIN — Medication 650 MILLIGRAM(S): at 14:58

## 2019-03-11 RX ADMIN — Medication 0.25 MILLIGRAM(S): at 21:18

## 2019-03-11 RX ADMIN — Medication 40 MILLIGRAM(S): at 05:19

## 2019-03-11 RX ADMIN — LIDOCAINE 1 PATCH: 4 CREAM TOPICAL at 05:20

## 2019-03-11 RX ADMIN — SERTRALINE 25 MILLIGRAM(S): 25 TABLET, FILM COATED ORAL at 11:06

## 2019-03-11 RX ADMIN — CHLORHEXIDINE GLUCONATE 1 APPLICATION(S): 213 SOLUTION TOPICAL at 05:19

## 2019-03-11 RX ADMIN — Medication 3 MILLILITER(S): at 20:02

## 2019-03-11 NOTE — PHARMACOTHERAPY INTERVENTION NOTE - COMMENTS
I spoke to Dr Cedillo So, she is not aware what the allergy to codeine is, but she wanted me to go through with the order and verify the medication.

## 2019-03-11 NOTE — PROGRESS NOTE ADULT - SUBJECTIVE AND OBJECTIVE BOX
EDILBERTO ARELLANO 74y Female  MRN#: [id number]       SUBJECTIVE  74 year old female with COPD follows Dr. Abrams (not on home O2) and anxiety presented with SOB and was found to have left lung mass and pleural effusion. She had cathether placement today on her left side. No acute events noted. Currently sitting comfortably in bed on nasal canula.     OBJECTIVE  PAST MEDICAL & SURGICAL HISTORY  Lung mass  COPD (chronic obstructive pulmonary disease)  Anxiety  H/O hernia repair  S/P appendectomy    ALLERGIES:  codeine (Rash)    MEDICATIONS:  STANDING MEDICATIONS  ALBUTerol/ipratropium for Nebulization 3 milliLiter(s) Nebulizer every 6 hours  chlorhexidine 4% Liquid 1 Application(s) Topical <User Schedule>  enoxaparin Injectable 40 milliGRAM(s) SubCutaneous daily  furosemide    Tablet 40 milliGRAM(s) Oral daily  lidocaine   Patch 1 Patch Transdermal daily  sertraline 25 milliGRAM(s) Oral daily    PRN MEDICATIONS  acetaminophen   Tablet .. 650 milliGRAM(s) Oral every 6 hours PRN  acetaminophen   Tablet .. 650 milliGRAM(s) Oral every 6 hours PRN  ALPRAZolam 0.25 milliGRAM(s) Oral three times a day PRN      VITAL SIGNS: Last 24 Hours  T(C): 36.1 (11 Mar 2019 09:49), Max: 37 (11 Mar 2019 07:11)  T(F): 96.9 (11 Mar 2019 09:49), Max: 98.6 (11 Mar 2019 07:11)  HR: 107 (11 Mar 2019 09:49) (90 - 119)  BP: 96/59 (11 Mar 2019 09:49) (96/59 - 110/71)  BP(mean): 82 (10 Mar 2019 19:00) (82 - 82)  RR: 19 (11 Mar 2019 09:49) (18 - 22)  SpO2: 100% (11 Mar 2019 09:55) (84% - 100%)    LABS:                        14.9   7.49  )-----------( 279      ( 10 Mar 2019 08:10 )             43.9     03-10    139  |  96<L>  |  14  ----------------------------<  124<H>  4.0   |  33<H>  |  0.6<L>    Ca    8.7      10 Mar 2019 08:10    RADIOLOGY:  < from: Xray Chest 1 View- PORTABLE-Routine (03.08.19 @ 06:21) >  Impression:      Left lung opacification and effusion, stable.    < end of copied text >    < from: CT Angio Chest w/ IV Cont (03.06.19 @ 19:58) >  IMPRESSION:     No evidence for acute pulmonary embolism. Left hilar mass encases and   narrows the left main pulmonary artery and subsequent branches.    Interval increase in size of a left pleural effusion and compressive   atelectasis, now moderate to large.    Redemonstration of a 5 cm left upper lobe consolidation compatible with   patient's mass or postobstructive consolidation. Clinical correlation   would be needed to exclude infection.        < end of copied text >    PHYSICAL EXAM:  GENERAL: NAD, well-developed, AAOx3, On nasal canula.  HEENT:  Atraumatic, Normocephalic. neck supple.  PULMONARY: Shallow breathing bilaterally. No wheezes or rhonchi.  CARDIOVASCULAR: Regular rate and rhythm; No murmurs  GASTROINTESTINAL: Soft, Nontender  EXTREMITIES:  2+ Peripheral Pulses, No edema    ADMISSION SUMMARY  Patient is a 74y old Female who presents with a chief complaint of SOB (11 Mar 2019 09:48)    ASSESSMENT & PLAN     # Acute hypoxic respiratory failure 2/2 L pleural effusion 2/2 malignancy  - supplemental O2 through [place]  - post status [place] catheter placement for recurring pleural effusion  - f/u pulm recommendations  -Possible outpatient follow up.  -Patient will possibly require home oxygen and visiting nurse to teach how to drain the catheter.  -Left Lung mass likely to be adenocarcinoma  - MRI brain negative for metastasis.  - PET scan outpatient  - pt has appointment w/Dr. Sanchez on 3/15 10:00 am     # COPD  - no wheezing, stable.  - pt completed prednisone taper   - duonebs prn    # Anxiety/ depression  - c/w sertraline  - xanax prn EDILBERTO ARELLANO 74y Female  MRN#: [id number]       SUBJECTIVE  74 year old female with COPD follows Dr. Abrams (not on home O2) and anxiety presented with SOB and was found to have left lung mass and pleural effusion. She had cathether placement today on her left side. No acute events noted. Currently sitting comfortably in bed on nasal canula.     OBJECTIVE  PAST MEDICAL & SURGICAL HISTORY  Lung mass  COPD (chronic obstructive pulmonary disease)  Anxiety  H/O hernia repair  S/P appendectomy    ALLERGIES:  codeine (Rash)    MEDICATIONS:  STANDING MEDICATIONS  ALBUTerol/ipratropium for Nebulization 3 milliLiter(s) Nebulizer every 6 hours  chlorhexidine 4% Liquid 1 Application(s) Topical <User Schedule>  enoxaparin Injectable 40 milliGRAM(s) SubCutaneous daily  furosemide    Tablet 40 milliGRAM(s) Oral daily  lidocaine   Patch 1 Patch Transdermal daily  sertraline 25 milliGRAM(s) Oral daily    PRN MEDICATIONS  acetaminophen   Tablet .. 650 milliGRAM(s) Oral every 6 hours PRN  acetaminophen   Tablet .. 650 milliGRAM(s) Oral every 6 hours PRN  ALPRAZolam 0.25 milliGRAM(s) Oral three times a day PRN      VITAL SIGNS: Last 24 Hours  T(C): 36.1 (11 Mar 2019 09:49), Max: 37 (11 Mar 2019 07:11)  T(F): 96.9 (11 Mar 2019 09:49), Max: 98.6 (11 Mar 2019 07:11)  HR: 107 (11 Mar 2019 09:49) (90 - 119)  BP: 96/59 (11 Mar 2019 09:49) (96/59 - 110/71)  BP(mean): 82 (10 Mar 2019 19:00) (82 - 82)  RR: 19 (11 Mar 2019 09:49) (18 - 22)  SpO2: 100% (11 Mar 2019 09:55) (84% - 100%)    LABS:                        14.9   7.49  )-----------( 279      ( 10 Mar 2019 08:10 )             43.9     03-10    139  |  96<L>  |  14  ----------------------------<  124<H>  4.0   |  33<H>  |  0.6<L>    Ca    8.7      10 Mar 2019 08:10    RADIOLOGY:  < from: Xray Chest 1 View- PORTABLE-Routine (03.08.19 @ 06:21) >  Impression:      Left lung opacification and effusion, stable.    < end of copied text >    < from: CT Angio Chest w/ IV Cont (03.06.19 @ 19:58) >  IMPRESSION:     No evidence for acute pulmonary embolism. Left hilar mass encases and   narrows the left main pulmonary artery and subsequent branches.    Interval increase in size of a left pleural effusion and compressive   atelectasis, now moderate to large.    Redemonstration of a 5 cm left upper lobe consolidation compatible with   patient's mass or postobstructive consolidation. Clinical correlation   would be needed to exclude infection.        < end of copied text >    PHYSICAL EXAM:  GENERAL: NAD, well-developed, AAOx3, On nasal canula.  HEENT:  Atraumatic, Normocephalic. neck supple.  PULMONARY: Shallow breathing bilaterally. No wheezes or rhonchi.  CARDIOVASCULAR: Regular rate and rhythm; No murmurs  GASTROINTESTINAL: Soft, Nontender  EXTREMITIES:  2+ Peripheral Pulses, No edema    ADMISSION SUMMARY  Patient is a 74y old Female who presents with a chief complaint of SOB (11 Mar 2019 09:48)    ASSESSMENT & PLAN     # Acute hypoxic respiratory failure 2/2 L pleural effusion 2/2 malignancy  - supplemental O2 through [place]  - post status [place] catheter placement for recurring pleural effusion  - f/u pulm recommendations  -Possible outpatient follow up.  -Patient will possibly require home oxygen and visiting nurse to teach how to drain the catheter.  -Left Lung mass likely to be adenocarcinoma  - MRI brain negative for metastasis.  - PET scan outpatient  - pt has appointment w/Dr. Sanchez on 3/15 10:00 am     # COPD exacerbation  -treated and resolved    - duonebs prn    # Anxiety/ depression  - c/w sertraline  - xanax prn EDILBERTO ARELLANO 74y Female  MRN#: [id number]       SUBJECTIVE  74 year old female with COPD follows Dr. Abrams (not on home O2) and anxiety presented with SOB and was found to have left lung mass and pleural effusion. She had cathether placement today on her left side. No acute events noted. Currently sitting comfortably in bed on nasal canula.     OBJECTIVE  PAST MEDICAL & SURGICAL HISTORY  Lung mass  COPD (chronic obstructive pulmonary disease)  Anxiety  H/O hernia repair  S/P appendectomy    ALLERGIES:  codeine (Rash)    MEDICATIONS:  STANDING MEDICATIONS  ALBUTerol/ipratropium for Nebulization 3 milliLiter(s) Nebulizer every 6 hours  chlorhexidine 4% Liquid 1 Application(s) Topical <User Schedule>  enoxaparin Injectable 40 milliGRAM(s) SubCutaneous daily  furosemide    Tablet 40 milliGRAM(s) Oral daily  lidocaine   Patch 1 Patch Transdermal daily  sertraline 25 milliGRAM(s) Oral daily    PRN MEDICATIONS  acetaminophen   Tablet .. 650 milliGRAM(s) Oral every 6 hours PRN  acetaminophen   Tablet .. 650 milliGRAM(s) Oral every 6 hours PRN  ALPRAZolam 0.25 milliGRAM(s) Oral three times a day PRN      VITAL SIGNS: Last 24 Hours  T(C): 36.1 (11 Mar 2019 09:49), Max: 37 (11 Mar 2019 07:11)  T(F): 96.9 (11 Mar 2019 09:49), Max: 98.6 (11 Mar 2019 07:11)  HR: 107 (11 Mar 2019 09:49) (90 - 119)  BP: 96/59 (11 Mar 2019 09:49) (96/59 - 110/71)  BP(mean): 82 (10 Mar 2019 19:00) (82 - 82)  RR: 19 (11 Mar 2019 09:49) (18 - 22)  SpO2: 100% (11 Mar 2019 09:55) (84% - 100%)    LABS:                        14.9   7.49  )-----------( 279      ( 10 Mar 2019 08:10 )             43.9     03-10    139  |  96<L>  |  14  ----------------------------<  124<H>  4.0   |  33<H>  |  0.6<L>    Ca    8.7      10 Mar 2019 08:10    RADIOLOGY:  < from: Xray Chest 1 View- PORTABLE-Routine (03.08.19 @ 06:21) >  Impression:      Left lung opacification and effusion, stable.    < end of copied text >    < from: CT Angio Chest w/ IV Cont (03.06.19 @ 19:58) >  IMPRESSION:     No evidence for acute pulmonary embolism. Left hilar mass encases and   narrows the left main pulmonary artery and subsequent branches.    Interval increase in size of a left pleural effusion and compressive   atelectasis, now moderate to large.    Redemonstration of a 5 cm left upper lobe consolidation compatible with   patient's mass or postobstructive consolidation. Clinical correlation   would be needed to exclude infection.        < end of copied text >    PHYSICAL EXAM:  GENERAL: NAD, well-developed, AAOx3, On nasal canula.  HEENT:  Atraumatic, Normocephalic. neck supple.  PULMONARY: Shallow breathing bilaterally. No wheezes or rhonchi.  CARDIOVASCULAR: Regular rate and rhythm; No murmurs  GASTROINTESTINAL: Soft, Nontender  EXTREMITIES:  2+ Peripheral Pulses, No edema    ADMISSION SUMMARY  Patient is a 74y old Female who presents with a chief complaint of SOB (11 Mar 2019 09:48)    ASSESSMENT & PLAN     # Acute hypoxic respiratory failure 2/2 L pleural effusion 2/2 malignancy  - supplemental O2 through [place]  - post status [place] catheter placement for recurring pleural effusion  - f/u pulm recommendations  -Possible outpatient follow up.  -Patient will possibly require home oxygen and visiting nurse to teach how to drain the catheter.  -Left Lung mass likely to be adenocarcinoma  - MRI brain negative for metastasis.  - PET scan outpatient  - pt has appointment w/Dr. Sanchez on 3/15 10:00 am     # COPD exacerbation  -treated and resolved  -Despite IV lasix/ antibiotics patient is still hypoxic. Pulse ox at room air at rest noted to be 84%.    # Anxiety/ depression  - c/w sertraline  - xanax prn EDILBERTO ARELLANO 74y Female  MRN#: [id number]       SUBJECTIVE  74 year old female with COPD follows Dr. Abrams (not on home O2) and anxiety presented with SOB and was found to have left lung mass and pleural effusion. She had cathether placement today on her left side. No acute events noted. Currently sitting comfortably in bed on nasal canula.     OBJECTIVE  PAST MEDICAL & SURGICAL HISTORY  Lung mass  COPD (chronic obstructive pulmonary disease)  Anxiety  H/O hernia repair  S/P appendectomy    ALLERGIES:  codeine (Rash)    MEDICATIONS:  STANDING MEDICATIONS  ALBUTerol/ipratropium for Nebulization 3 milliLiter(s) Nebulizer every 6 hours  chlorhexidine 4% Liquid 1 Application(s) Topical <User Schedule>  enoxaparin Injectable 40 milliGRAM(s) SubCutaneous daily  furosemide    Tablet 40 milliGRAM(s) Oral daily  lidocaine   Patch 1 Patch Transdermal daily  sertraline 25 milliGRAM(s) Oral daily    PRN MEDICATIONS  acetaminophen   Tablet .. 650 milliGRAM(s) Oral every 6 hours PRN  acetaminophen   Tablet .. 650 milliGRAM(s) Oral every 6 hours PRN  ALPRAZolam 0.25 milliGRAM(s) Oral three times a day PRN      VITAL SIGNS: Last 24 Hours  T(C): 36.1 (11 Mar 2019 09:49), Max: 37 (11 Mar 2019 07:11)  T(F): 96.9 (11 Mar 2019 09:49), Max: 98.6 (11 Mar 2019 07:11)  HR: 107 (11 Mar 2019 09:49) (90 - 119)  BP: 96/59 (11 Mar 2019 09:49) (96/59 - 110/71)  BP(mean): 82 (10 Mar 2019 19:00) (82 - 82)  RR: 19 (11 Mar 2019 09:49) (18 - 22)  SpO2: 100% (11 Mar 2019 09:55) (84% - 100%)    LABS:                        14.9   7.49  )-----------( 279      ( 10 Mar 2019 08:10 )             43.9     03-10    139  |  96<L>  |  14  ----------------------------<  124<H>  4.0   |  33<H>  |  0.6<L>    Ca    8.7      10 Mar 2019 08:10    RADIOLOGY:  < from: Xray Chest 1 View- PORTABLE-Routine (03.08.19 @ 06:21) >  Impression:      Left lung opacification and effusion, stable.    < end of copied text >    < from: CT Angio Chest w/ IV Cont (03.06.19 @ 19:58) >  IMPRESSION:     No evidence for acute pulmonary embolism. Left hilar mass encases and   narrows the left main pulmonary artery and subsequent branches.    Interval increase in size of a left pleural effusion and compressive   atelectasis, now moderate to large.    Redemonstration of a 5 cm left upper lobe consolidation compatible with   patient's mass or postobstructive consolidation. Clinical correlation   would be needed to exclude infection.        < end of copied text >    PHYSICAL EXAM:  GENERAL: NAD, well-developed, AAOx3, On nasal canula.  HEENT:  Atraumatic, Normocephalic. neck supple.  PULMONARY: Shallow breathing bilaterally. No wheezes or rhonchi.  CARDIOVASCULAR: Regular rate and rhythm; No murmurs  GASTROINTESTINAL: Soft, Nontender  EXTREMITIES:  2+ Peripheral Pulses, No edema    ADMISSION SUMMARY  Patient is a 74y old Female who presents with a chief complaint of SOB (11 Mar 2019 09:48)    ASSESSMENT & PLAN     # Acute hypoxic respiratory failure 2/2 L pleural effusion 2/2 malignancy  - supplemental O2 through [place]  - post status [place] catheter placement for recurring pleural effusion  - f/u pulm recommendations  -Possible outpatient follow up.  -Patient will possibly require home oxygen and visiting nurse to teach how to drain the catheter.  -Left Lung mass likely to be adenocarcinoma  - MRI brain negative for metastasis.  - PET scan outpatient  - pt has appointment w/Dr. Sanchez on 3/15 10:00 am     # COPD exacerbation  -treated and resolved  -Despite IV lasix/ antibiotics patient is still hypoxic. Pulse ox at room air at rest noted to be 84%.  -Patient tested in a chronic stable state. Patient is aware that she is going on Home O2.    # Anxiety/ depression  - c/w sertraline  - xanax prn

## 2019-03-11 NOTE — CHART NOTE - NSCHARTNOTEFT_GEN_A_CORE
DATE OF PROCEDURE: 3/11/2019    PREOPERATIVE DIAGNOSIS:  Pleural effusion    POSTOPERATIVE DIAGNOSIS: Pleural effusion    PROCEDURE PERFORMED:  [place] catheter placement    SURGEON: Dr. Townsend    COMPLICATIONS:  None    CONSENT: after obtaining the risk and benefit the consent was obtained from the     DESCRIPTION OF PROCEDURE:   The patient was placed on her right lateral side with the  left side facing up. The chest and upper abdomen were prepped and draped in the usual sterile fashion. Lidocaine 1% was used to infiltrate the area that was localized by an ultrasound along the anterior axillary line  and the pleural space was accessed by Seldinger technique, incision was made around the guidewire.  Another incision was performed 5cm below the guidewire after infiltrating with 1 % lidocain, and then the PleurX catheter was tunneled through this incision to exit from the incision around the guidewire.  A sheath introducer was then passed over the wire and then the PleurX catheter was placed through the sheath introducer. There was good return of fluid. The two small incisions was closed with Monocryl stitch.  The catheter was capped off, and sterile dressings were applied. The patient tolerated the procedure well without any complications. Chest Xray was ordered.

## 2019-03-12 ENCOUNTER — TRANSCRIPTION ENCOUNTER (OUTPATIENT)
Age: 74
End: 2019-03-12

## 2019-03-12 VITALS
SYSTOLIC BLOOD PRESSURE: 91 MMHG | DIASTOLIC BLOOD PRESSURE: 61 MMHG | TEMPERATURE: 99 F | RESPIRATION RATE: 17 BRPM | HEART RATE: 116 BPM

## 2019-03-12 LAB
ANION GAP SERPL CALC-SCNC: 13 MMOL/L — SIGNIFICANT CHANGE UP (ref 7–14)
BUN SERPL-MCNC: 20 MG/DL — SIGNIFICANT CHANGE UP (ref 10–20)
CALCIUM SERPL-MCNC: 8.5 MG/DL — SIGNIFICANT CHANGE UP (ref 8.5–10.1)
CHLORIDE SERPL-SCNC: 95 MMOL/L — LOW (ref 98–110)
CO2 SERPL-SCNC: 32 MMOL/L — SIGNIFICANT CHANGE UP (ref 17–32)
CREAT SERPL-MCNC: 0.6 MG/DL — LOW (ref 0.7–1.5)
CULTURE RESULTS: SIGNIFICANT CHANGE UP
GLUCOSE SERPL-MCNC: 232 MG/DL — HIGH (ref 70–99)
HCT VFR BLD CALC: 45.7 % — SIGNIFICANT CHANGE UP (ref 37–47)
HGB BLD-MCNC: 15.5 G/DL — SIGNIFICANT CHANGE UP (ref 12–16)
MCHC RBC-ENTMCNC: 31.9 PG — HIGH (ref 27–31)
MCHC RBC-ENTMCNC: 33.9 G/DL — SIGNIFICANT CHANGE UP (ref 32–37)
MCV RBC AUTO: 94 FL — SIGNIFICANT CHANGE UP (ref 81–99)
NRBC # BLD: 0 /100 WBCS — SIGNIFICANT CHANGE UP (ref 0–0)
PLATELET # BLD AUTO: 293 K/UL — SIGNIFICANT CHANGE UP (ref 130–400)
POTASSIUM SERPL-MCNC: 3.5 MMOL/L — SIGNIFICANT CHANGE UP (ref 3.5–5)
POTASSIUM SERPL-SCNC: 3.5 MMOL/L — SIGNIFICANT CHANGE UP (ref 3.5–5)
RBC # BLD: 4.86 M/UL — SIGNIFICANT CHANGE UP (ref 4.2–5.4)
RBC # FLD: 11.9 % — SIGNIFICANT CHANGE UP (ref 11.5–14.5)
SODIUM SERPL-SCNC: 140 MMOL/L — SIGNIFICANT CHANGE UP (ref 135–146)
SPECIMEN SOURCE: SIGNIFICANT CHANGE UP
WBC # BLD: 8.28 K/UL — SIGNIFICANT CHANGE UP (ref 4.8–10.8)
WBC # FLD AUTO: 8.28 K/UL — SIGNIFICANT CHANGE UP (ref 4.8–10.8)

## 2019-03-12 RX ORDER — ALPRAZOLAM 0.25 MG
1 TABLET ORAL
Qty: 0 | Refills: 0 | COMMUNITY

## 2019-03-12 RX ORDER — ALPRAZOLAM 0.25 MG
1 TABLET ORAL
Qty: 0 | Refills: 0 | COMMUNITY
Start: 2019-03-12

## 2019-03-12 RX ORDER — ALPRAZOLAM 0.25 MG
1 TABLET ORAL
Qty: 30 | Refills: 0 | OUTPATIENT
Start: 2019-03-12 | End: 2019-03-21

## 2019-03-12 RX ADMIN — CHLORHEXIDINE GLUCONATE 1 APPLICATION(S): 213 SOLUTION TOPICAL at 05:10

## 2019-03-12 RX ADMIN — Medication 3 MILLILITER(S): at 07:34

## 2019-03-12 RX ADMIN — SERTRALINE 25 MILLIGRAM(S): 25 TABLET, FILM COATED ORAL at 11:18

## 2019-03-12 RX ADMIN — Medication 40 MILLIGRAM(S): at 05:10

## 2019-03-12 RX ADMIN — ENOXAPARIN SODIUM 40 MILLIGRAM(S): 100 INJECTION SUBCUTANEOUS at 11:18

## 2019-03-12 RX ADMIN — LIDOCAINE 1 PATCH: 4 CREAM TOPICAL at 11:18

## 2019-03-12 RX ADMIN — Medication 3 MILLILITER(S): at 13:17

## 2019-03-12 RX ADMIN — Medication 650 MILLIGRAM(S): at 13:59

## 2019-03-12 NOTE — ED POST DISCHARGE NOTE - RESULT SUMMARY
KNOWN LUNG MASS, PLERUAL FLUID POSITIVE FOR MALIGNANCY. WILL CALL TO MAKE SURE PATIENT IS FOLLOWING UP WITH PULMONARY AND ONCOLOGY.

## 2019-03-12 NOTE — PROGRESS NOTE ADULT - SUBJECTIVE AND OBJECTIVE BOX
EDILBERTO ARELLANO 74y Female  MRN#: [id number]       SUBJECTIVE  74 year old female with COPD follows Dr. Abrams (not on home O2) and anxiety presented with SOB and was found to have left lung mass and pleural effusion. The catheter was drained today and 700 cc of fluid was taken out. . No other acute events noted. Currently sitting comfortably in bed on nasal canula.     OBJECTIVE  PAST MEDICAL & SURGICAL HISTORY  Lung mass  COPD (chronic obstructive pulmonary disease)  Anxiety  H/O hernia repair  S/P appendectomy    ALLERGIES:  codeine (Rash)    MEDICATIONS:  STANDING MEDICATIONS  ALBUTerol/ipratropium for Nebulization 3 milliLiter(s) Nebulizer every 6 hours  chlorhexidine 4% Liquid 1 Application(s) Topical <User Schedule>  enoxaparin Injectable 40 milliGRAM(s) SubCutaneous daily  furosemide    Tablet 40 milliGRAM(s) Oral daily  lidocaine   Patch 1 Patch Transdermal daily  sertraline 25 milliGRAM(s) Oral daily    PRN MEDICATIONS  acetaminophen   Tablet .. 650 milliGRAM(s) Oral every 6 hours PRN  acetaminophen   Tablet .. 650 milliGRAM(s) Oral every 6 hours PRN  ALPRAZolam 0.25 milliGRAM(s) Oral three times a day PRN      VITAL SIGNS: Last 24 Hours  T(C): 36.9 (12 Mar 2019 05:03), Max: 36.9 (12 Mar 2019 05:03)  T(F): 98.5 (12 Mar 2019 05:03), Max: 98.5 (12 Mar 2019 05:03)  HR: 99 (12 Mar 2019 05:03) (97 - 108)  BP: 105/61 (12 Mar 2019 05:03) (98/60 - 105/67)  BP(mean): --  RR: 16 (12 Mar 2019 05:03) (16 - 17)  SpO2: 97% (12 Mar 2019 05:03) (97% - 97%)    LABS:                        15.5   8.28  )-----------( 293      ( 12 Mar 2019 09:50 )             45.7     03-12    140  |  95<L>  |  20  ----------------------------<  232<H>  3.5   |  32  |  0.6<L>    Ca    8.5      12 Mar 2019 09:50    < from: Xray Chest 1 View- PORTABLE-Routine (03.11.19 @ 15:58) >  Impression:      Stable left lower lung and lingular opacification with overlying pleural   catheter.     Stable small left pneumothorax.    < end of copied text >  PHYSICAL EXAM:  GENERAL: AAOx3, On nasal canula. Slightly Distressed.  HEENT:  Atraumatic, Normocephalic. neck supple.  PULMONARY: Breathing improved. No wheezes or rhonchi.  CARDIOVASCULAR: Regular rate and rhythm; No murmurs  GASTROINTESTINAL: Soft, Nontender  EXTREMITIES:  2+ Peripheral Pulses, No edema    ADMISSION SUMMARY  Patient is a 74y old Female who presents with a chief complaint of SOB (11 Mar 2019 09:48)    ASSESSMENT & PLAN     # Acute hypoxic respiratory failure 2/2 L pleural effusion 2/2 malignancy  - supplemental O2 through [place]  - post status [place] catheter placement for recurring pleural effusion  - f/u pulm recommendations  -Left Lung mass likely to be adenocarcinoma  - MRI brain negative for metastasis.  - PET scan outpatient  - pt has appointment w/Dr. Sanchez on 3/15 10:00 am     # COPD exacerbation  -treated and resolved  -Despite IV lasix/ antibiotics patient is still hypoxic. Pulse ox at room air at rest noted to be 84%.  -Patient tested in a chronic stable state. Patient is aware that she is going on Home O2.    # Anxiety/ depression  - c/w sertraline  - xanax prn    # DVT prophylaxis  -On lovenox

## 2019-03-12 NOTE — DISCHARGE NOTE PROVIDER - NSDCCPCAREPLAN_GEN_ALL_CORE_FT
PRINCIPAL DISCHARGE DIAGNOSIS  Problem: Pleural effusion on left  Assessment and Plan of Treatment: To optimize the patient. Follow up with pulmonary and hem/oncoogy for further workup.      SECONDARY DISCHARGE DIAGNOSES  Problem: Lung mass  Assessment and Plan of Treatment:

## 2019-03-12 NOTE — PROGRESS NOTE ADULT - ASSESSMENT
Patient is a 74y old Female who presents with a chief complaint of SOB    #acute hypoxic resp failure 2/2 malignant pleural effusion - continue on supplemental O2  #malignant pleural effusion on Lt s/p multiple thoracentesis, pleurx catheter placed 3/11.  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  MRI brain neg for mets.  for OP f/u with oncology.  appt scheduled for 3/15  #acute COPD exacerbation - resolved.  now with chronic hypoxic resp failure with sats 84% on RA.  she will be set up with home O2  #generalized body pains - improved. continue lidocaine patches, tylenol PRN  #anxiety - continue zoloft and xanax    medically stable for discharge  home health has been set up  she will be discharged on home O2  discharge d/w pt and her  at bedside, in agreement with plan  needs close f/u with oncology; appt scheduled  total time spent 40min

## 2019-03-12 NOTE — PROGRESS NOTE ADULT - SUBJECTIVE AND OBJECTIVE BOX
EDILBERTO ARELLANO  74y, Female  Allergy: codeine (Rash)    Hospital Day: 6d    Patient seen and examined earlier today.  says pain at the catheter site has improved.  feels a little more SOB today but says she is getting drained further today.    PMH/PSH:  PAST MEDICAL & SURGICAL HISTORY:  Lung mass  COPD (chronic obstructive pulmonary disease)  Anxiety  H/O hernia repair  S/P appendectomy    VITALS:  T(F): 98.5 (03-12-19 @ 05:03), Max: 98.5 (03-12-19 @ 05:03)  HR: 99 (03-12-19 @ 05:03)  BP: 105/61 (03-12-19 @ 05:03) (98/60 - 105/67)  RR: 16 (03-12-19 @ 05:03)  SpO2: 97% (03-12-19 @ 05:03)    TESTS & MEASUREMENTS:  Weight (Kg): 46.6 (03-11-19 @ 06:09)  BMI (kg/m2): 20.1 (03-11)                          15.5   8.28  )-----------( 293      ( 12 Mar 2019 09:50 )             45.7       03-12    140  |  95<L>  |  20  ----------------------------<  232<H>  3.5   |  32  |  0.6<L>    Ca    8.5      12 Mar 2019 09:50              Culture - Body Fluid with Gram Stain (collected 03-07-19 @ 12:26)  Source: .Body Fluid Pleural Fluid  Gram Stain (03-07-19 @ 22:44):    polymorphonuclear leukocytes seen    No organisms seen    by cytocentrifuge  Preliminary Report (03-08-19 @ 18:27):    No growth      RADIOLOGY & ADDITIONAL TESTS:  < from: Xray Chest 1 View- PORTABLE-Routine (03.11.19 @ 15:58) >  Impression:      Stable left lower lung and lingular opacification with overlying pleural   catheter.     Stable small left pneumothorax.    < end of copied text >      MEDICATIONS:  MEDICATIONS  (STANDING):  ALBUTerol/ipratropium for Nebulization 3 milliLiter(s) Nebulizer every 6 hours  chlorhexidine 4% Liquid 1 Application(s) Topical <User Schedule>  enoxaparin Injectable 40 milliGRAM(s) SubCutaneous daily  furosemide    Tablet 40 milliGRAM(s) Oral daily  lidocaine   Patch 1 Patch Transdermal daily  sertraline 25 milliGRAM(s) Oral daily    MEDICATIONS  (PRN):  acetaminophen   Tablet .. 650 milliGRAM(s) Oral every 6 hours PRN Moderate Pain (4 - 6)  acetaminophen   Tablet .. 650 milliGRAM(s) Oral every 6 hours PRN Temp greater or equal to 38C (100.4F), Mild Pain (1 - 3)  ALPRAZolam 0.25 milliGRAM(s) Oral three times a day PRN anxiety      HOME MEDICATIONS:  albuterol 90 mcg/inh inhalation aerosol with adapter (03-06)  ALPRAZolam 0.25 mg oral tablet (03-06)  Anoro Ellipta 62.5 mcg-25 mcg/inh inhalation powder (03-06)  furosemide 40 mg oral tablet (03-06)  predniSONE (03-06)  Zoloft 25 mg oral tablet (03-06)      PHYSICAL EXAM:  GENERAL: A&O x3,  in NAD  NECK: No Swelling  CHEST/LUNG: Good air entry, diminished bs, Lt pleurx in place  HEART: RRR, No murmurs  ABDOMEN: Soft, Bowel sounds present  EXTREMITIES:  No clubbing, no edema

## 2019-03-12 NOTE — CHART NOTE - NSCHARTNOTEFT_GEN_A_CORE
<<<RESIDENT DISCHARGE NOTE>>>     EDILBERTO ARELLANO  [id number]    VITAL SIGNS:  T(F): 98.9 (03-12-19 @ 12:30), Max: 98.9 (03-12-19 @ 12:30)  HR: 116 (03-12-19 @ 12:30)  BP: 91/61 (03-12-19 @ 12:30)  SpO2: 97% (03-12-19 @ 05:03)      PHYSICAL EXAMINATION:  GENERAL: AAOx3, On nasal canula. Slightly Distressed.  HEENT:  Atraumatic, Normocephalic. neck supple.  PULMONARY: Breathing improved. No wheezes or rhonchi.  CARDIOVASCULAR: Regular rate and rhythm; No murmurs  GASTROINTESTINAL: Soft, Nontender  EXTREMITIES:  2+ Peripheral Pulses, No edema    TEST RESULTS:                        15.5   8.28  )-----------( 293      ( 12 Mar 2019 09:50 )             45.7       03-12    140  |  95<L>  |  20  ----------------------------<  232<H>  3.5   |  32  |  0.6<L>    Ca    8.5      12 Mar 2019 09:50        FINAL DISCHARGE INTERVIEW:  Resident(s) Present: (Name:Terence) RN Present: (Name:Madelaine)    DISCHARGE MEDICATION RECONCILIATION  reviewed with Attending (Name: Dr.So de la cruz)    DISPOSITION:   [ X ] Home,    [  ] Home with Visiting Nursing Services,   [    ]  SNF/ NH,    [   ] Acute Rehab (4A),   [   ] Other (Specify:_________)

## 2019-03-12 NOTE — DISCHARGE NOTE PROVIDER - CARE PROVIDER_API CALL
Easton Abrams (DO)  Critical Care Medicine; Pulmonary Disease; Sleep Medicine  [address] [facility], [address]  [place] [address]  Phone: [phone]  Fax: [phone]  Follow Up Time:     López Kidd (DO)  Hematology; Internal Medicine; Medical Oncology  [address] [place] [address]  Phone: [phone]  Fax: [phone]  Follow Up Time:

## 2019-03-12 NOTE — DISCHARGE NOTE NURSING/CASE MANAGEMENT/SOCIAL WORK - NSDCDPATPORTLINK_GEN_ALL_CORE
You can access the [facility] Portal, offered by [facility], by registering with the following website: [email or web address]

## 2019-03-12 NOTE — ED POST DISCHARGE NOTE - DETAILS
SPOKE WITH SON, WHO IS CARETAKER AND ARRANGES ALL F/U AT [facility] [place]. PATIENT HAS F/U AT [facility] AND HAS KNOWN LUNG CANCER.

## 2019-03-12 NOTE — PROGRESS NOTE ADULT - SUBJECTIVE AND OBJECTIVE BOX
OVERNIGHT EVENTS: doing better, still sob    Vital Signs Last 24 Hrs  T(C): 37.2 (12 Mar 2019 12:30), Max: 37.2 (12 Mar 2019 12:30)  T(F): 98.9 (12 Mar 2019 12:30), Max: 98.9 (12 Mar 2019 12:30)  HR: 116 (12 Mar 2019 12:30) (97 - 116)  BP: 91/61 (12 Mar 2019 12:30) (91/61 - 105/6  RR: 17 (12 Mar 2019 12:30) (16 - 17)  SpO2: 97% (12 Mar 2019 05:03) (97% - 97%)    PHYSICAL EXAMINATION:    GENERAL: cachectic    HEENT: Head is normocephalic and atraumatic. Extraocular muscles are intact. Mucous membranes are moist.    NECK: Supple.    LUNGS: dec bs l side         HEART: Regular rate and rhythm without murmur.    ABDOMEN: Soft, nontender, and nondistended.      EXTREMITIES: Without any cyanosis, clubbing, rash, lesions or edema.    NEUROLOGIC: Grossly intact.    SKIN: No ulceration or induration present.      LABS:                        15.5   8.28  )-----------( 293      ( 12 Mar 2019 09:50 )             45.7     03-12    140  |  95<L>  |  20  ----------------------------<  232<H>  3.5   |  32  |  0.6<L>    Ca    8.5      12 Mar 2019 09:50                              MICROBIOLOGY:      MEDICATIONS  (STANDING):  ALBUTerol/ipratropium for Nebulization 3 milliLiter(s) Nebulizer every 6 hours  chlorhexidine 4% Liquid 1 Application(s) Topical <User Schedule>  enoxaparin Injectable 40 milliGRAM(s) SubCutaneous daily  furosemide    Tablet 40 milliGRAM(s) Oral daily  lidocaine   Patch 1 Patch Transdermal daily  sertraline 25 milliGRAM(s) Oral daily    MEDICATIONS  (PRN):  acetaminophen   Tablet .. 650 milliGRAM(s) Oral every 6 hours PRN Moderate Pain (4 - 6)  acetaminophen   Tablet .. 650 milliGRAM(s) Oral every 6 hours PRN Temp greater or equal to 38C (100.4F), Mild Pain (1 - 3)  ALPRAZolam 0.25 milliGRAM(s) Oral three times a day PRN anxiety      RADIOLOGY & ADDITIONAL STUDIES:

## 2019-03-12 NOTE — DISCHARGE NOTE PROVIDER - HOSPITAL COURSE
74 year old female with COPD follows Dr. Abrams (not on home O2) and anxiety presented with SOB aluid was taken out. . No other acute events noted. Currently sitting comfortably in bed on nasal canula. Home oxygen has been established and the patient is being discharged to do a follow up outpatient with pulmnology and hem/oncology for further workup.

## 2019-03-12 NOTE — PROGRESS NOTE ADULT - ATTENDING COMMENTS
Pt seen/examined  records reviewed  NAD  VSS  Tachypnea  RRR S1S2NL  no organomegaly    Pleural catheter pending  F/u appt given with me on 3.15.19
pt seen/examined  records reviewed    VSS  NAD  NILDA  RRR  Tachypnea  no organomegaly    catheter placement  f/u me as outpatient; appt is given;
pt seen/examined;   Records reviewed    On exam  NAD  VSS  RRR S1S2NL \  CTA B/L  No organomegaly  no edema  NILDA  s/p pleural cath placement    MRI brain neg for mets  appt is given with me in the clinic; will get PET scan; f/u pathology report on EGFR/ALK
pt seen and evaluated independently.  pt's  and daughter at bedside.  pt feels well besides having some pain at the recent denver catheter insertion site.  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  MRI brain neg for mets.  for OP f/u with oncology.  appt scheduled for 3/15  #acute COPD exacerbation - resolved.  now with chronic hypoxic resp failure with sats 84% on RA.  she will be set up with home O2  #generalized body pains - will start on tramadol.  opioid naive pt  #anxiety - continue zoloft and xanax    Progress Note Handoff  Pending:  home O2, VNS set up  Patient/Family discussion: d/w pt and family at bedside  Disposition: stable for d/c home, hopefully today, most likely tomorrow

## 2019-03-12 NOTE — PROGRESS NOTE ADULT - ASSESSMENT
IMPRESSION:    Left sided pleural effusion - due to adenocarcinoma s/p thoracentesis recurrent  COPD not in exacerbation    RECOMMENDATIONS:      pleurx catheter will drain today  Hem/Onc evaluation  pox ra and on ambulation/ op f/up   Overall prognosis is poor  Case discusses extensively with    dc planning

## 2019-03-13 RX ORDER — ALPRAZOLAM 0.25 MG
1 TABLET ORAL
Qty: 10 | Refills: 0 | OUTPATIENT
Start: 2019-03-13 | End: 2019-03-17

## 2019-03-15 ENCOUNTER — APPOINTMENT (OUTPATIENT)
Dept: HEMATOLOGY ONCOLOGY | Facility: CLINIC | Age: 74
End: 2019-03-15

## 2019-03-15 DIAGNOSIS — F41.9 ANXIETY DISORDER, UNSPECIFIED: ICD-10-CM

## 2019-03-15 DIAGNOSIS — R06.02 SHORTNESS OF BREATH: ICD-10-CM

## 2019-03-15 DIAGNOSIS — J91.0 MALIGNANT PLEURAL EFFUSION: ICD-10-CM

## 2019-03-15 DIAGNOSIS — Z71.6 TOBACCO ABUSE COUNSELING: ICD-10-CM

## 2019-03-15 DIAGNOSIS — J96.01 ACUTE RESPIRATORY FAILURE WITH HYPOXIA: ICD-10-CM

## 2019-03-15 DIAGNOSIS — J44.1 CHRONIC OBSTRUCTIVE PULMONARY DISEASE WITH (ACUTE) EXACERBATION: ICD-10-CM

## 2019-03-15 DIAGNOSIS — F17.200 NICOTINE DEPENDENCE, UNSPECIFIED, UNCOMPLICATED: ICD-10-CM

## 2019-03-15 DIAGNOSIS — C78.7 SECONDARY MALIGNANT NEOPLASM OF LIVER AND INTRAHEPATIC BILE DUCT: ICD-10-CM

## 2019-03-15 DIAGNOSIS — C34.92 MALIGNANT NEOPLASM OF UNSPECIFIED PART OF LEFT BRONCHUS OR LUNG: ICD-10-CM
